# Patient Record
Sex: MALE | Race: WHITE | NOT HISPANIC OR LATINO | URBAN - METROPOLITAN AREA
[De-identification: names, ages, dates, MRNs, and addresses within clinical notes are randomized per-mention and may not be internally consistent; named-entity substitution may affect disease eponyms.]

---

## 2019-08-02 ENCOUNTER — APPOINTMENT (RX ONLY)
Dept: URBAN - METROPOLITAN AREA CLINIC 50 | Facility: CLINIC | Age: 54
Setting detail: DERMATOLOGY
End: 2019-08-02

## 2019-08-02 DIAGNOSIS — L82.0 INFLAMED SEBORRHEIC KERATOSIS: ICD-10-CM

## 2019-08-02 DIAGNOSIS — D22 MELANOCYTIC NEVI: ICD-10-CM

## 2019-08-02 DIAGNOSIS — D485 NEOPLASM OF UNCERTAIN BEHAVIOR OF SKIN: ICD-10-CM

## 2019-08-02 PROBLEM — D22.22 MELANOCYTIC NEVI OF LEFT EAR AND EXTERNAL AURICULAR CANAL: Status: ACTIVE | Noted: 2019-08-02

## 2019-08-02 PROBLEM — D48.5 NEOPLASM OF UNCERTAIN BEHAVIOR OF SKIN: Status: ACTIVE | Noted: 2019-08-02

## 2019-08-02 PROBLEM — I10 ESSENTIAL (PRIMARY) HYPERTENSION: Status: ACTIVE | Noted: 2019-08-02

## 2019-08-02 PROBLEM — E78.5 HYPERLIPIDEMIA, UNSPECIFIED: Status: ACTIVE | Noted: 2019-08-02

## 2019-08-02 PROBLEM — D22.39 MELANOCYTIC NEVI OF OTHER PARTS OF FACE: Status: ACTIVE | Noted: 2019-08-02

## 2019-08-02 PROCEDURE — 99202 OFFICE O/P NEW SF 15 MIN: CPT | Mod: 25

## 2019-08-02 PROCEDURE — 17110 DESTRUCTION B9 LES UP TO 14: CPT

## 2019-08-02 PROCEDURE — ? BIOPSY BY SHAVE METHOD

## 2019-08-02 PROCEDURE — ? COUNSELING

## 2019-08-02 PROCEDURE — ? LIQUID NITROGEN

## 2019-08-02 PROCEDURE — 11102 TANGNTL BX SKIN SINGLE LES: CPT | Mod: 59

## 2019-08-02 ASSESSMENT — LOCATION ZONE DERM
LOCATION ZONE: FACE
LOCATION ZONE: EAR
LOCATION ZONE: NECK

## 2019-08-02 ASSESSMENT — LOCATION DETAILED DESCRIPTION DERM
LOCATION DETAILED: LEFT EXTERNAL AUDITORY CANAL
LOCATION DETAILED: LEFT CENTRAL MALAR CHEEK
LOCATION DETAILED: RIGHT LATERAL SUBMANDIBULAR CHEEK
LOCATION DETAILED: RIGHT MID TEMPLE
LOCATION DETAILED: RIGHT CLAVICULAR NECK
LOCATION DETAILED: RIGHT MEDIAL ZYGOMA

## 2019-08-02 ASSESSMENT — LOCATION SIMPLE DESCRIPTION DERM
LOCATION SIMPLE: RIGHT ANTERIOR NECK
LOCATION SIMPLE: RIGHT ZYGOMA
LOCATION SIMPLE: RIGHT CHEEK
LOCATION SIMPLE: LEFT EAR
LOCATION SIMPLE: RIGHT TEMPLE
LOCATION SIMPLE: LEFT CHEEK

## 2019-08-02 NOTE — PROCEDURE: LIQUID NITROGEN
Consent: The patient's consent was obtained including but not limited to risks of crusting, scabbing, blistering, scarring, darker or lighter pigmentary change, recurrence, incomplete removal and infection.
Medical Necessity Information: It is in your best interest to select a reason for this procedure from the list below. All of these items fulfill various CMS LCD requirements except the new and changing color options.
Medical Necessity Clause: This procedure was medically necessary because the lesions that were treated were:
Add 52 Modifier (Optional): no
Detail Level: Simple
Post-Care Instructions: I reviewed with the patient in detail post-care instructions. Patient is to wear sunprotection, and avoid picking at any of the treated lesions. Pt may apply Vaseline to crusted or scabbing areas.

## 2019-10-04 ENCOUNTER — APPOINTMENT (RX ONLY)
Dept: URBAN - METROPOLITAN AREA CLINIC 50 | Facility: CLINIC | Age: 54
Setting detail: DERMATOLOGY
End: 2019-10-04

## 2019-10-04 DIAGNOSIS — D22 MELANOCYTIC NEVI: ICD-10-CM

## 2019-10-04 DIAGNOSIS — D485 NEOPLASM OF UNCERTAIN BEHAVIOR OF SKIN: ICD-10-CM

## 2019-10-04 PROBLEM — D22.39 MELANOCYTIC NEVI OF OTHER PARTS OF FACE: Status: ACTIVE | Noted: 2019-10-04

## 2019-10-04 PROBLEM — D48.5 NEOPLASM OF UNCERTAIN BEHAVIOR OF SKIN: Status: ACTIVE | Noted: 2019-10-04

## 2019-10-04 PROBLEM — D22.22 MELANOCYTIC NEVI OF LEFT EAR AND EXTERNAL AURICULAR CANAL: Status: ACTIVE | Noted: 2019-10-04

## 2019-10-04 PROCEDURE — ? BIOPSY BY SHAVE METHOD

## 2019-10-04 PROCEDURE — 99212 OFFICE O/P EST SF 10 MIN: CPT | Mod: 25

## 2019-10-04 PROCEDURE — 11103 TANGNTL BX SKIN EA SEP/ADDL: CPT

## 2019-10-04 PROCEDURE — ? COUNSELING

## 2019-10-04 PROCEDURE — 11102 TANGNTL BX SKIN SINGLE LES: CPT

## 2019-10-04 ASSESSMENT — LOCATION SIMPLE DESCRIPTION DERM
LOCATION SIMPLE: RIGHT EYELID
LOCATION SIMPLE: LEFT EAR
LOCATION SIMPLE: LEFT SUPERIOR EYELID
LOCATION SIMPLE: LEFT CHEEK

## 2019-10-04 ASSESSMENT — LOCATION ZONE DERM
LOCATION ZONE: EYELID
LOCATION ZONE: FACE
LOCATION ZONE: EAR

## 2019-10-04 ASSESSMENT — LOCATION DETAILED DESCRIPTION DERM
LOCATION DETAILED: LEFT TRAGUS
LOCATION DETAILED: RIGHT MEDIAL CANTHUS
LOCATION DETAILED: LEFT MEDIAL SUPERIOR EYELID
LOCATION DETAILED: LEFT SUPERIOR MEDIAL BUCCAL CHEEK

## 2019-10-04 NOTE — HPI: SKIN LESION (SKIN TAGS)
Is This A New Presentation, Or A Follow-Up?: Skin Lesions
Additional History: Pt states no other areas of concern at today’s exam.

## 2019-10-04 NOTE — PROCEDURE: BIOPSY BY SHAVE METHOD
Consent: Written consent was obtained and risks were reviewed including but not limited to scarring, infection, bleeding, scabbing, incomplete removal, nerve damage and allergy to anesthesia.
Depth Of Biopsy: dermis
Bill For Surgical Tray: no
X Size Of Lesion In Cm: 0
Biopsy Type: H and E
Electrodesiccation And Curettage Text: The wound bed was treated with electrodesiccation and curettage after the biopsy was performed.
Type Of Destruction Used: Curettage
Anesthesia Volume In Cc (Will Not Render If 0): 0.3
Render Post-Care Instructions In Note?: yes
Dressing: bandage
Silver Nitrate Text: The wound bed was treated with silver nitrate after the biopsy was performed.
Hemostasis: Electrocautery
Biopsy Method: Personna blade
Curettage Text: The wound bed was treated with curettage after the biopsy was performed.
Detail Level: Detailed
Lab: 6
Billing Type: Third-Party Bill
Post-Care Instructions: I reviewed with the patient in detail post-care instructions. Patient is to keep the biopsy site dry overnight, and then apply bacitracin twice daily until healed.
Cryotherapy Text: The wound bed was treated with cryotherapy after the biopsy was performed.
Size Of Lesion In Cm: 0.2
Notification Instructions: Patient will be notified of biopsy results. However, patient instructed to call the office if not contacted within 2 weeks.
Anesthesia Type: 1% lidocaine with epinephrine
Wound Care: Petrolatum
Electrodesiccation Text: The wound bed was treated with electrodesiccation after the biopsy was performed.

## 2021-05-18 ENCOUNTER — APPOINTMENT (RX ONLY)
Dept: URBAN - METROPOLITAN AREA CLINIC 66 | Facility: CLINIC | Age: 56
Setting detail: DERMATOLOGY
End: 2021-05-18

## 2021-05-18 DIAGNOSIS — L30.4 ERYTHEMA INTERTRIGO: ICD-10-CM

## 2021-05-18 DIAGNOSIS — D485 NEOPLASM OF UNCERTAIN BEHAVIOR OF SKIN: ICD-10-CM | Status: RESOLVED

## 2021-05-18 DIAGNOSIS — L82.0 INFLAMED SEBORRHEIC KERATOSIS: ICD-10-CM

## 2021-05-18 PROBLEM — D48.5 NEOPLASM OF UNCERTAIN BEHAVIOR OF SKIN: Status: ACTIVE | Noted: 2021-05-18

## 2021-05-18 PROCEDURE — 11310 SHAVE SKIN LESION 0.5 CM/<: CPT

## 2021-05-18 PROCEDURE — ? COUNSELING

## 2021-05-18 PROCEDURE — ? PRESCRIPTION MEDICATION MANAGEMENT

## 2021-05-18 PROCEDURE — ? PRESCRIPTION

## 2021-05-18 PROCEDURE — 99214 OFFICE O/P EST MOD 30 MIN: CPT | Mod: 25

## 2021-05-18 PROCEDURE — ? SHAVE REMOVAL (NO PATHOLOGY)

## 2021-05-18 PROCEDURE — ? BIOPSY BY SHAVE METHOD

## 2021-05-18 PROCEDURE — 11102 TANGNTL BX SKIN SINGLE LES: CPT | Mod: 59

## 2021-05-18 PROCEDURE — ? FULL BODY SKIN EXAM - DECLINED

## 2021-05-18 RX ORDER — NYSTATIN 100000 [USP'U]/G
1 CREAM TOPICAL BID
Qty: 1 | Refills: 3 | Status: ERX | COMMUNITY
Start: 2021-05-18

## 2021-05-18 RX ADMIN — NYSTATIN 1: 100000 CREAM TOPICAL at 00:00

## 2021-05-18 ASSESSMENT — LOCATION SIMPLE DESCRIPTION DERM
LOCATION SIMPLE: ABDOMEN
LOCATION SIMPLE: RIGHT FOREHEAD

## 2021-05-18 ASSESSMENT — LOCATION ZONE DERM
LOCATION ZONE: TRUNK
LOCATION ZONE: FACE

## 2021-05-18 ASSESSMENT — LOCATION DETAILED DESCRIPTION DERM
LOCATION DETAILED: XIPHOID
LOCATION DETAILED: PERIUMBILICAL SKIN
LOCATION DETAILED: RIGHT INFERIOR LATERAL FOREHEAD
LOCATION DETAILED: RIGHT LATERAL FOREHEAD

## 2022-01-28 ENCOUNTER — APPOINTMENT (RX ONLY)
Dept: URBAN - METROPOLITAN AREA CLINIC 50 | Facility: CLINIC | Age: 57
Setting detail: DERMATOLOGY
End: 2022-01-28

## 2022-01-28 DIAGNOSIS — L65.9 NONSCARRING HAIR LOSS, UNSPECIFIED: ICD-10-CM | Status: STABLE

## 2022-01-28 DIAGNOSIS — L91.8 OTHER HYPERTROPHIC DISORDERS OF THE SKIN: ICD-10-CM

## 2022-01-28 PROCEDURE — ? TREATMENT REGIMEN

## 2022-01-28 PROCEDURE — ? LIQUID NITROGEN

## 2022-01-28 PROCEDURE — 17110 DESTRUCTION B9 LES UP TO 14: CPT

## 2022-01-28 PROCEDURE — ? COUNSELING

## 2022-01-28 PROCEDURE — ? DEFER

## 2022-01-28 PROCEDURE — 99213 OFFICE O/P EST LOW 20 MIN: CPT | Mod: 25

## 2022-01-28 ASSESSMENT — LOCATION ZONE DERM
LOCATION ZONE: EYELID
LOCATION ZONE: FACE
LOCATION ZONE: SCALP

## 2022-01-28 ASSESSMENT — LOCATION DETAILED DESCRIPTION DERM
LOCATION DETAILED: RIGHT LATERAL INFERIOR EYELID
LOCATION DETAILED: LEFT CENTRAL POSTAURICULAR SKIN
LOCATION DETAILED: LEFT SUPERIOR CENTRAL MALAR CHEEK
LOCATION DETAILED: LEFT LATERAL SUPERIOR TARSAL REGION
LOCATION DETAILED: LEFT CENTRAL FRONTAL SCALP
LOCATION DETAILED: RIGHT MEDIAL FRONTAL SCALP
LOCATION DETAILED: LEFT LATERAL SUPERIOR EYELID
LOCATION DETAILED: RIGHT INFERIOR POSTAURICULAR SKIN
LOCATION DETAILED: RIGHT LATERAL SUPERIOR EYELID

## 2022-01-28 ASSESSMENT — LOCATION SIMPLE DESCRIPTION DERM
LOCATION SIMPLE: SCALP
LOCATION SIMPLE: LEFT LATERAL SUPERIOR TARSAL REGION
LOCATION SIMPLE: LEFT SUPERIOR EYELID
LOCATION SIMPLE: LEFT CHEEK
LOCATION SIMPLE: RIGHT SCALP
LOCATION SIMPLE: RIGHT SUPERIOR EYELID
LOCATION SIMPLE: RIGHT INFERIOR EYELID

## 2022-01-28 NOTE — PROCEDURE: LIQUID NITROGEN
Show Aperture Variable?: Yes
Include Z78.9 (Other Specified Conditions Influencing Health Status) As An Associated Diagnosis?: No
Consent: The patient's consent was obtained including but not limited to risks of crusting, scabbing, blistering, scarring, darker or lighter pigmentary change, recurrence, incomplete removal and infection.
Medical Necessity Clause: This procedure was medically necessary because the lesions that were treated were:
Post-Care Instructions: I reviewed with the patient in detail post-care instructions. Patient is to wear sunprotection, and avoid picking at any of the treated lesions. Pt may apply Vaseline to crusted or scabbing areas.
Detail Level: Detailed
Spray Paint Text: The liquid nitrogen was applied to the skin utilizing a spray paint frosting technique.
Medical Necessity Information: It is in your best interest to select a reason for this procedure from the list below. All of these items fulfill various CMS LCD requirements except the new and changing color options.

## 2022-01-28 NOTE — PROCEDURE: DEFER
Detail Level: Detailed
Instructions (Optional): Recommended pt f/u with ophthalmology for removal of tags on eyelids due to size, location, and proximity to the globe. Pt agreeable.
Introduction Text (Please End With A Colon): The following procedure was deferred:

## 2022-01-28 NOTE — PROCEDURE: TREATMENT REGIMEN
Plan: Briefly discussed Proscriptix line. Pt will continue using Keeps minoxidil foam.
Detail Level: Zone

## 2022-01-28 NOTE — HPI: SKIN LESION (SKIN TAGS)
How Severe Are They?: moderate
Is This A New Presentation, Or A Follow-Up?: Skin Lesions
Additional History: Pt stares lesions get irritated by grooming and wearing masks.

## 2023-04-04 ENCOUNTER — HOSPITAL ENCOUNTER (OUTPATIENT)
Dept: GENERAL RADIOLOGY | Facility: HOSPITAL | Age: 58
Discharge: HOME | End: 2023-04-04
Admitting: NURSE PRACTITIONER
Payer: COMMERCIAL

## 2023-04-04 ENCOUNTER — TRANSCRIBE ORDERS (OUTPATIENT)
Dept: ADMINISTRATIVE | Facility: HOSPITAL | Age: 58
End: 2023-04-04
Payer: COMMERCIAL

## 2023-04-04 DIAGNOSIS — I10 ESSENTIAL HYPERTENSION, MALIGNANT: Primary | ICD-10-CM

## 2023-04-04 DIAGNOSIS — I10 ESSENTIAL HYPERTENSION, MALIGNANT: ICD-10-CM

## 2023-04-04 PROCEDURE — 71046 X-RAY EXAM CHEST 2 VIEWS: CPT

## 2023-12-31 ENCOUNTER — HOSPITAL ENCOUNTER (EMERGENCY)
Facility: HOSPITAL | Age: 58
Discharge: HOME | End: 2023-12-31
Attending: STUDENT IN AN ORGANIZED HEALTH CARE EDUCATION/TRAINING PROGRAM | Admitting: STUDENT IN AN ORGANIZED HEALTH CARE EDUCATION/TRAINING PROGRAM
Payer: COMMERCIAL

## 2023-12-31 VITALS
RESPIRATION RATE: 20 BRPM | TEMPERATURE: 98.2 F | OXYGEN SATURATION: 95 % | WEIGHT: 268.5 LBS | BODY MASS INDEX: 37.59 KG/M2 | SYSTOLIC BLOOD PRESSURE: 126 MMHG | HEART RATE: 83 BPM | HEIGHT: 71 IN | DIASTOLIC BLOOD PRESSURE: 77 MMHG

## 2023-12-31 DIAGNOSIS — R04.0 FREQUENT EPISTAXIS: ICD-10-CM

## 2023-12-31 DIAGNOSIS — R04.0 ACUTE ANTERIOR EPISTAXIS: Primary | ICD-10-CM

## 2023-12-31 PROCEDURE — 63710000001 ONDANSETRON ODT 4 MG TABLET DISPERSIBLE: Performed by: STUDENT IN AN ORGANIZED HEALTH CARE EDUCATION/TRAINING PROGRAM

## 2023-12-31 PROCEDURE — 99283 EMERGENCY DEPT VISIT LOW MDM: CPT

## 2023-12-31 RX ORDER — CANDESARTAN 32 MG/1
32 TABLET ORAL DAILY
COMMUNITY

## 2023-12-31 RX ORDER — GUAIFENESIN, PSEUDOEPHEDRINE HYDROCHLORIDE 600; 60 MG/1; MG/1
1 TABLET, EXTENDED RELEASE ORAL EVERY 12 HOURS
COMMUNITY

## 2023-12-31 RX ORDER — ONDANSETRON 4 MG/1
4 TABLET, ORALLY DISINTEGRATING ORAL ONCE
Status: COMPLETED | OUTPATIENT
Start: 2023-12-31 | End: 2023-12-31

## 2023-12-31 RX ORDER — TRANEXAMIC ACID 100 MG/ML
500 INJECTION, SOLUTION INTRAVENOUS ONCE
Status: COMPLETED | OUTPATIENT
Start: 2023-12-31 | End: 2023-12-31

## 2023-12-31 RX ORDER — ROSUVASTATIN CALCIUM 10 MG/1
10 TABLET, COATED ORAL DAILY
COMMUNITY

## 2023-12-31 RX ORDER — ALFUZOSIN HYDROCHLORIDE 10 MG/1
10 TABLET, EXTENDED RELEASE ORAL DAILY
COMMUNITY

## 2023-12-31 RX ORDER — TRANEXAMIC ACID 100 MG/ML
500 INJECTION, SOLUTION INTRAVENOUS ONCE
Status: DISCONTINUED | OUTPATIENT
Start: 2023-12-31 | End: 2023-12-31

## 2023-12-31 RX ORDER — OXYMETAZOLINE HYDROCHLORIDE 0.05 G/100ML
2 SPRAY NASAL ONCE AS NEEDED
Status: COMPLETED | OUTPATIENT
Start: 2023-12-31 | End: 2023-12-31

## 2023-12-31 RX ADMIN — SILVER NITRATE APPLICATORS 1 APPLICATION: 25; 75 STICK TOPICAL at 02:06

## 2023-12-31 RX ADMIN — OXYMETAZOLINE HYDROCHLORIDE 2 SPRAY: 5 SPRAY NASAL at 02:00

## 2023-12-31 RX ADMIN — ONDANSETRON 4 MG: 4 TABLET, ORALLY DISINTEGRATING ORAL at 02:06

## 2023-12-31 RX ADMIN — TRANEXAMIC ACID 500 MG: 100 INJECTION, SOLUTION INTRAVENOUS at 02:06

## 2023-12-31 NOTE — Clinical Note
[facility] EMERGENCY DEPARTMENT  [address]  [facility] [address]  Phone: [phone]    Obed Sanches was seen and treated in our emergency department on 12/31/2023.  He may return to work on 01/01/2024.         Thank you for choosing [facility].    Marbin Meredith MD

## 2023-12-31 NOTE — ED PROVIDER NOTES
"EMERGENCY DEPARTMENT ATTENDING NOTE    Patient Name: Obed Sanches    Chief Complaint   Patient presents with    Nose Bleed       PATIENT PRESENTATION:  Obed Sanches is a very pleasant 58 y.o. male with history of frequent epistaxis that is never required emergency department intervention for his repair present emerged department due to intractable epistaxis.    Patient anticoagulation denies any dizziness or lightheadedness.  He states the bleeding is been intermittent and stopped and then recurred.  States he may have about 2 hours on the left side deafly on the right.  No trauma denies picking his nose or any recent trauma.  Denies anticoagulation including aspirin or Xarelto.  Denies any dizziness lightheadedness any chest pain any symptoms just getting the bleeding to stop.  No history of bleeding disorders.      PHYSICAL EXAM:   VS: /77   Pulse 83   Temp 98.2 °F (36.8 °C)   Resp 20   Ht 179.1 cm (70.5\")   Wt 122 kg (268 lb 8 oz)   SpO2 95%   BMI 37.98 kg/m²   GENERAL: Well-appearing male gentleman sitting up in stretcher no distress well-nourished, well-developed, awake, alert, no acute distress, nontoxic appearing, comfortable  EYES: PERRL, sclerae anicteric, extraocular movements grossly intact, symmetric lids  EARS, NOSE, MOUTH, THROAT: atraumatic external nose and ears, moist mucous membranes; left nare with evidence of significant blood clot with some venous oozing; no evidence of significant hemorrhage down the posterior pharynx  NECK: symmetric, trachea midline  RESPIRATORY: unlabored respiratory effort, clear to auscultation bilaterally, good air movement  CARDIOVASCULAR: no murmurs, peripheral pulses 2+ and equal in all extremities  SKIN: warm and dry with no obvious rashes  NEUROLOGIC: moving all 4 extremities symmetrically, alert and orient x 3  PSYCHIATRIC: alert, pleasant and cooperative. Appropriate mood and affect.      MEDICAL DECISION MAKING:    Obed Sanches is a 58 y.o. male " who presented to the ED due to significant epistaxis.  Patient large clot so immediately had patient blow his nose out to get all the clot removed he then had some persistent venous oozing.  Afrin splayed and then pressure was applied.  Following this he still with some large clot in the posterior nare which continued to ooze so had him blow his nose again and eventually got the entirety of the clot which essentially the entire nasal passage out.  Following that Afrin is applied a second time with significant pressure the bleeding then remitted.  Ultimately able to use silver nitrate and cauterized area of bleeding.  Patient was observed for over an hour with no recurrence of bleed.  Counseled him regarding return precautions and plan if he really bleeds.  He is discharged home with plan for with ENT as an outpatient with significant return precautions from the emerged part for assistance.  He will be given dose of oral Zofran given I suspect patient swallowed significant the blood although he was not endorsing significant nausea in the emergency department.    Internal chart review:   Past Medical History:   Diagnosis Date    BPH (benign prostatic hyperplasia)     Hyperlipidemia     Hypertension        Past Surgical History:   Procedure Laterality Date    TONSILLECTOMY         No Known Allergies    No current facility-administered medications for this encounter.    Current Outpatient Medications:     alfuzosin (UROXATRAL) 10 MG 24 hr tablet, Take 1 tablet by mouth Daily., Disp: , Rfl:     candesartan (ATACAND) 32 MG tablet, Take 1 tablet by mouth Daily., Disp: , Rfl:     pseudoephedrine-guaifenesin (MUCINEX D)  MG per 12 hr tablet, Take 1 tablet by mouth Every 12 (Twelve) Hours., Disp: , Rfl:     rosuvastatin (CRESTOR) 10 MG tablet, Take 1 tablet by mouth Daily., Disp: , Rfl:     Procedure:     Epistaxis Management    Date/Time: 12/31/2023 2:09 AM    Performed by: Marbin Meredith MD  Authorized by: Masoud  Marbin PÉREZ MD    Consent:     Consent obtained:  Verbal    Consent given by:  Patient    Risks, benefits, and alternatives were discussed: yes      Risks discussed:  Bleeding, infection, nasal injury and pain    Alternatives discussed:  Referral  [place] protocol:     Procedure explained and questions answered to patient or proxy's satisfaction: yes      Patient identity confirmed:  Verbally with patient  Procedure details:     Treatment site:  L septum    Treatment method:  Silver nitrate (& TXA soaked gauze & Afrin)    Treatment complexity:  Limited    Treatment episode: recurring    Post-procedure details:     Assessment:  Bleeding stopped    Procedure completion:  Tolerated well, no immediate complications      ED Diagnosis:  Frequent epistaxis; Acute anterior epistaxis    Disposition: to home  Follow up plan: ENT follow up within 1 week, return to ED immediately if symptoms worsen        Signed:  Marbin Meredith MD  Emergency Medicine Physician    Please note that portions of this note were completed with a voice recognition program.      Marbin Meredith MD  12/31/23 0818

## 2023-12-31 NOTE — ED PROVIDER NOTES
Subjective   History of Present Illness  Patient is a 58-year-old male that presents to the emergency department for nosebleed.  Patient reports that over the last 5 months he has been experiencing at least 1 nosebleed a month.  He states that usually these are following him blowing his nose and is usually always the left nare.  He states that today bleeding started through the left nare following patient blowing his nose and has been ongoing for the last 2 hours.  He states that he has tried applying pressure as well as Afrin nose spray prior to arrival that had no relief in symptoms.  Patient denies any anticoagulation therapy.  He denies any recent injury or trauma to the nose.  States that prior to 5 months ago he never had any issues with this.  Denies any      Review of Systems   HENT:  Positive for nosebleeds.    All other systems reviewed and are negative.      Past Medical History:   Diagnosis Date   • BPH (benign prostatic hyperplasia)    • Hyperlipidemia    • Hypertension        No Known Allergies    Past Surgical History:   Procedure Laterality Date   • TONSILLECTOMY         History reviewed. No pertinent family history.    Social History     Socioeconomic History   • Marital status:    Tobacco Use   • Smoking status: Never   • Smokeless tobacco: Never   Substance and Sexual Activity   • Alcohol use: Not Currently           Objective   Physical Exam    Procedures           ED Course                                             Medical Decision Making  Problems Addressed:  Frequent epistaxis: acute illness or injury        Final diagnoses:   Frequent epistaxis       ED Disposition  ED Disposition       ED Disposition   Discharge    Condition   Stable    Comment   --               Patrick Michele, APRN  [address] [facility] [address] [phone]    Schedule an appointment as soon as possible for a visit on 1/3/2024      Cheikh Mast MD  [address] [facility] [address] [facility]  [address]  [facility] [address]  [phone]    Schedule an appointment as soon as possible for a visit on 1/12/2024      [facility] EMERGENCY DEPARTMENT  [address] [place] [id number] [phone]    If symptoms worsen         Medication List      No changes were made to your prescriptions during this visit.

## 2023-12-31 NOTE — DISCHARGE INSTRUCTIONS
Today you are seen for your symptoms he had a pretty significant nosebleed.  We were able to get it to stop by placing silver nitrate cautery after using Afrin.  I discussed I want you to blow your nose for least the next 40 hours if possible.  Please call the attached number for our ENT group to schedule follow-up for later this week.  He can also follow-up with primary care provider.  If any of her symptoms worsen recurs we discussed first try blowing her nose spray and Afrin and holding it for the minutes at this stops then you should be okay but if it recurs or does not stop, to immediate return to the nearest emergency department.

## 2024-01-29 ENCOUNTER — HOSPITAL ENCOUNTER (OUTPATIENT)
Dept: GENERAL RADIOLOGY | Facility: HOSPITAL | Age: 59
Discharge: HOME | End: 2024-01-29
Admitting: NURSE PRACTITIONER
Payer: COMMERCIAL

## 2024-01-29 ENCOUNTER — TRANSCRIBE ORDERS (OUTPATIENT)
Dept: GENERAL RADIOLOGY | Facility: HOSPITAL | Age: 59
End: 2024-01-29
Payer: COMMERCIAL

## 2024-01-29 DIAGNOSIS — R05.2 SUBACUTE COUGH: ICD-10-CM

## 2024-01-29 DIAGNOSIS — R05.2 SUBACUTE COUGH: Primary | ICD-10-CM

## 2024-01-29 PROCEDURE — 71046 X-RAY EXAM CHEST 2 VIEWS: CPT

## 2024-05-25 ENCOUNTER — HOSPITAL ENCOUNTER (EMERGENCY)
Age: 59
Discharge: HOME | End: 2024-05-25
Attending: EMERGENCY MEDICINE
Payer: COMMERCIAL

## 2024-05-25 ENCOUNTER — APPOINTMENT (OUTPATIENT)
Dept: GENERAL RADIOLOGY | Age: 59
End: 2024-05-25
Payer: COMMERCIAL

## 2024-05-25 VITALS
SYSTOLIC BLOOD PRESSURE: 132 MMHG | TEMPERATURE: 98.2 F | HEART RATE: 78 BPM | OXYGEN SATURATION: 99 % | RESPIRATION RATE: 18 BRPM | DIASTOLIC BLOOD PRESSURE: 89 MMHG | WEIGHT: 255 LBS

## 2024-05-25 DIAGNOSIS — D69.6 THROMBOCYTOPENIA (HCC): ICD-10-CM

## 2024-05-25 DIAGNOSIS — M79.89 SWELLING OF RIGHT INDEX FINGER: Primary | ICD-10-CM

## 2024-05-25 LAB
ALBUMIN SERPL-MCNC: 4.4 G/DL (ref 3.5–5.2)
ALP SERPL-CCNC: 94 U/L (ref 40–130)
ALT SERPL-CCNC: 23 U/L (ref 5–41)
ANION GAP SERPL CALCULATED.3IONS-SCNC: 9 MMOL/L (ref 7–19)
AST SERPL-CCNC: 23 U/L (ref 5–40)
BASOPHILS # BLD: 0.1 K/UL (ref 0–0.2)
BASOPHILS NFR BLD: 0.7 % (ref 0–1)
BILIRUB SERPL-MCNC: 0.4 MG/DL (ref 0.2–1.2)
BUN SERPL-MCNC: 16 MG/DL (ref 6–20)
CALCIUM SERPL-MCNC: 9.3 MG/DL (ref 8.6–10)
CHLORIDE SERPL-SCNC: 102 MMOL/L (ref 98–111)
CO2 SERPL-SCNC: 27 MMOL/L (ref 22–29)
CREAT SERPL-MCNC: 0.9 MG/DL (ref 0.5–1.2)
EOSINOPHIL # BLD: 0.8 K/UL (ref 0–0.6)
EOSINOPHIL NFR BLD: 7.2 % (ref 0–5)
ERYTHROCYTE [DISTWIDTH] IN BLOOD BY AUTOMATED COUNT: 12.8 % (ref 11.5–14.5)
GLUCOSE SERPL-MCNC: 82 MG/DL (ref 74–109)
HCT VFR BLD AUTO: 44.9 % (ref 42–52)
HGB BLD-MCNC: 14.6 G/DL (ref 14–18)
IMM GRANULOCYTES # BLD: 0 K/UL
LYMPHOCYTES # BLD: 3.1 K/UL (ref 1.1–4.5)
LYMPHOCYTES NFR BLD: 27.5 % (ref 20–40)
MCH RBC QN AUTO: 29.6 PG (ref 27–31)
MCHC RBC AUTO-ENTMCNC: 32.5 G/DL (ref 33–37)
MCV RBC AUTO: 91.1 FL (ref 80–94)
MONOCYTES # BLD: 1.5 K/UL (ref 0–0.9)
MONOCYTES NFR BLD: 13.7 % (ref 0–10)
NEUTROPHILS # BLD: 5.7 K/UL (ref 1.5–7.5)
NEUTS SEG NFR BLD: 50.6 % (ref 50–65)
PLATELET # BLD AUTO: 30 K/UL (ref 130–400)
PLATELET SLIDE REVIEW: ABNORMAL
PMV BLD AUTO: 13.4 FL (ref 9.4–12.4)
POTASSIUM SERPL-SCNC: 4.3 MMOL/L (ref 3.5–5)
PROT SERPL-MCNC: 8 G/DL (ref 6.6–8.7)
RBC # BLD AUTO: 4.93 M/UL (ref 4.7–6.1)
SODIUM SERPL-SCNC: 138 MMOL/L (ref 136–145)
URATE SERPL-MCNC: 7.8 MG/DL (ref 3.4–7)
WBC # BLD AUTO: 11.2 K/UL (ref 4.8–10.8)

## 2024-05-25 PROCEDURE — 99284 EMERGENCY DEPT VISIT MOD MDM: CPT

## 2024-05-25 PROCEDURE — 73130 X-RAY EXAM OF HAND: CPT

## 2024-05-25 PROCEDURE — 36415 COLL VENOUS BLD VENIPUNCTURE: CPT

## 2024-05-25 PROCEDURE — 84550 ASSAY OF BLOOD/URIC ACID: CPT

## 2024-05-25 PROCEDURE — 6370000000 HC RX 637 (ALT 250 FOR IP): Performed by: EMERGENCY MEDICINE

## 2024-05-25 PROCEDURE — 80053 COMPREHEN METABOLIC PANEL: CPT

## 2024-05-25 PROCEDURE — 85025 COMPLETE CBC W/AUTO DIFF WBC: CPT

## 2024-05-25 RX ORDER — ACETAMINOPHEN 325 MG/1
650 TABLET ORAL ONCE
Status: COMPLETED | OUTPATIENT
Start: 2024-05-25 | End: 2024-05-25

## 2024-05-25 RX ORDER — CARVEDILOL 6.25 MG/1
1 TABLET ORAL 2 TIMES DAILY WITH MEALS
COMMUNITY

## 2024-05-25 RX ORDER — CEPHALEXIN 500 MG/1
500 CAPSULE ORAL 4 TIMES DAILY
Qty: 28 CAPSULE | Refills: 0 | Status: SHIPPED | OUTPATIENT
Start: 2024-05-25 | End: 2024-06-01

## 2024-05-25 RX ORDER — METHYLPREDNISOLONE 4 MG/1
TABLET ORAL
Qty: 1 KIT | Refills: 0 | Status: SHIPPED | OUTPATIENT
Start: 2024-05-25

## 2024-05-25 RX ORDER — ROSUVASTATIN CALCIUM 10 MG/1
10 TABLET, COATED ORAL DAILY
COMMUNITY

## 2024-05-25 RX ADMIN — ACETAMINOPHEN 650 MG: 325 TABLET ORAL at 03:21

## 2024-05-25 ASSESSMENT — PAIN SCALES - GENERAL
PAINLEVEL_OUTOF10: 8
PAINLEVEL_OUTOF10: 2
PAINLEVEL_OUTOF10: 8

## 2024-05-25 ASSESSMENT — PAIN DESCRIPTION - ORIENTATION: ORIENTATION: RIGHT

## 2024-05-25 ASSESSMENT — PAIN - FUNCTIONAL ASSESSMENT: PAIN_FUNCTIONAL_ASSESSMENT: 0-10

## 2024-05-25 ASSESSMENT — PAIN DESCRIPTION - LOCATION: LOCATION: FINGER (COMMENT WHICH ONE)

## 2024-05-25 ASSESSMENT — ENCOUNTER SYMPTOMS
COUGH: 0
RHINORRHEA: 0
SHORTNESS OF BREATH: 0
COLOR CHANGE: 1
VOMITING: 0
NAUSEA: 0
BACK PAIN: 0
ABDOMINAL PAIN: 0
SORE THROAT: 0
DIARRHEA: 0

## 2024-05-25 NOTE — ED PROVIDER NOTES
[facility] EMERGENCY DEPT  eMERGENCY dEPARTMENT eNCOUnter      Pt Name: Buddy Bangura  MRN: [id number]  Birthdate 1965  Date of evaluation: 5/25/2024  Provider: CITLALLI RENAE MD    CHIEF COMPLAINT       Chief Complaint   Patient presents with    hand swelling     Index finger swelling and redness x3 days with pain          HISTORY OF PRESENT ILLNESS   (Location/Symptom, Timing/Onset,Context/Setting, Quality, Duration, Modifying Factors, Severity)  Note limiting factors.   Buddy Bangura is a 58 y.o. male who presents to the emergency department for right index finger pain and swelling over the past 3 days.  Does not recall any injury.  No fevers or chills.  Noticed initially he had some redness and swelling over his PIP joint and then tonight noticed some increased redness and swelling over the metacarpal phalangeal area which prompted him to come in.  He states he is otherwise overall pretty healthy but recently had routine blood work and was found to be thrombocytopenic with a platelet count of 28,000 and has a hematology appointment this next week.  Admits to prior history of gout in his foot but many years ago.    The history is provided by the patient.       NursingNotes were reviewed.    REVIEW OF SYSTEMS    (2-9 systems for level 4, 10 or more for level 5)     Review of Systems   Constitutional:  Negative for chills and fever.   HENT:  Negative for rhinorrhea and sore throat.    Respiratory:  Negative for cough and shortness of breath.    Cardiovascular:  Negative for chest pain.   Gastrointestinal:  Negative for abdominal pain, diarrhea, nausea and vomiting.   Genitourinary:  Negative for dysuria and frequency.   Musculoskeletal:  Negative for back pain and neck pain.   Skin:  Positive for color change.   Neurological:  Negative for dizziness and headaches.            PAST MEDICALHISTORY     Past Medical History:   Diagnosis Date    Hyperlipidemia     Hypertension          SURGICAL HISTORY       Past Surgical History:  over the proximal interphalangeal and metacarpophalangeal joints with edema noted from the MCP to PIP area he can range his fingers slightly decreased though.  No obvious foreign bodies.  Isolated to only index finger area.   Skin:     General: Skin is warm and dry.   Neurological:      Mental Status: He is alert and oriented to person, place, and time.           DIAGNOSTIC RESULTS           RADIOLOGY:  Non-plain film images such as CT, Ultrasound and MRI are read by the radiologist. Plain radiographic images are visualized and preliminarily interpreted bythe emergency physician with the below findings:      XR HAND RIGHT (MIN 3 VIEWS)    (Results Pending)     Index finger swelling no FB or fx      LABS:  Labs Reviewed   CBC WITH AUTO DIFFERENTIAL - Abnormal; Notable for the following components:       Result Value    WBC 11.2 (*)     MCHC 32.5 (*)     Platelets 30 (*)     MPV 13.4 (*)     Monocytes % 13.7 (*)     Eosinophils % 7.2 (*)     Monocytes Absolute 1.50 (*)     Eosinophils Absolute 0.80 (*)     All other components within normal limits   URIC ACID - Abnormal; Notable for the following components:    Uric Acid 7.8 (*)     All other components within normal limits   COMPREHENSIVE METABOLIC PANEL       All other labs were within normal range or not returned as of this dictation.    EMERGENCY DEPARTMENT COURSE and DIFFERENTIAL DIAGNOSIS/MDM:   Vitals:    Vitals:    05/25/24 0127   BP: (!) 139/98   Pulse: 82   Resp: 18   Temp: 98.2 °F (36.8 °C)   TempSrc: Oral   SpO2: 97%   Weight: 115.7 kg (255 lb)       MDM     Amount and/or Complexity of Data Reviewed  Clinical lab tests: ordered and reviewed  Tests in the radiology section of CPT®: reviewed and ordered  Independent visualization of images, tracings, or specimens: yes      VSS, afebrile pt in NAD, 3 day hx of PIP joint erythema and edema now tonight with MCP with similar.  No trauma hx.  Xray neg besides can see some edema.  Wbc 11.2 uric 7.8.  has had gout

## 2024-05-31 ENCOUNTER — TELEPHONE (OUTPATIENT)
Dept: HEMATOLOGY | Age: 59
End: 2024-05-31

## 2024-05-31 NOTE — TELEPHONE ENCOUNTER
Called pt. to remind them of appointment on 06/2024 and had to leave a detailed voicemail with appointment date and time

## 2024-06-04 DIAGNOSIS — D69.6 THROMBOCYTOPENIA (HCC): Primary | ICD-10-CM

## 2024-06-04 NOTE — PROGRESS NOTES
OP HEMATOLOGY/ONCOLOGY CONSULTATION      Pt Name: Buddy Bangura  YOB: 1965  MRN: [id number]  Referring provider: FARSHAD Jiménez  Requesting provider: FARSHAD Calle  Reason for consultation: Thrombocytopenia   Date of evaluation: 6/5/2024    History Obtained From:  patient, electronic medical record    CHIEF COMPLAINT:    Chief Complaint   Patient presents with    New Patient     Thrombocytopenia     HISTORY OF PRESENT ILLNESS:    Buddy Bangura is a 58 y.o.  male referred to the clinic by FARSHAD Calle for evaluation of Thrombocytopenia.  Hematology consultation is performed 6/5/2024.    Review of prior CBCs:  CBC 3/13/2015 ([facility]): WBC: 8.50, Hgb: 14.1, MCV: 89.6, Platelets: 159,000  CBC 4/4/2023 (PCP): WBC: 8.9, Hgb: 15.7, MCV 91.1, Platelets: 51,000  CBC 5/15/2024 (PCP): WBC: 8.9, Hgb: 15.2, MCV: 90, Platelets: 22,000  CBC 5/25/2024 ([facility]): WBC: 11.2, Hgb: 14.6, MCV: 91.1, Platelets: 30,000, AMC: 1.50, Eos: 0.80    Additional labs:  CMP 5/25/2024 ([facility]): Creatinine 0.9, GFR >90, Calcium 9.3, Total Protein: 8.0, LFTs: unremarkable   Uric Acid 5/25/2024 ([facility]): 7.8 (3.4 - 7.0)    Patient denies any known history of thrombocytopenia.  He denies a history of alcohol use or known liver disease.  He has easy bruising, no bleeding issues.  He denies B symptoms.  Buddy reports intentional weight loss.  No palpable lymphadenopathies.  No overt splenomegaly.  Possible causes of thrombocytopenia discussed.  Baseline serology requested in addition to ultrasound liver and spleen.  If unrevealing, recommend Bone marrow aspirate and biopsy for further evaluation.  Platelets stable today at 31,000.    Past Medical History:   Diagnosis Date    Enlarged prostate     Hyperlipidemia     Hypertension     Low testosterone     Thrombocytopenia (HCC)      Past Surgical History:   Procedure Laterality Date    TONSILLECTOMY AND ADENOIDECTOMY         Current Outpatient Medications:     candesartan

## 2024-06-05 ENCOUNTER — OFFICE VISIT (OUTPATIENT)
Dept: HEMATOLOGY | Age: 59
End: 2024-06-05
Payer: COMMERCIAL

## 2024-06-05 ENCOUNTER — HOSPITAL ENCOUNTER (OUTPATIENT)
Dept: INFUSION THERAPY | Age: 59
Discharge: HOME | End: 2024-06-05
Payer: COMMERCIAL

## 2024-06-05 VITALS
OXYGEN SATURATION: 96 % | WEIGHT: 257 LBS | TEMPERATURE: 98.6 F | DIASTOLIC BLOOD PRESSURE: 86 MMHG | BODY MASS INDEX: 36.79 KG/M2 | HEART RATE: 92 BPM | SYSTOLIC BLOOD PRESSURE: 130 MMHG | HEIGHT: 70 IN

## 2024-06-05 DIAGNOSIS — Z71.89 CARE PLAN DISCUSSED WITH PATIENT: ICD-10-CM

## 2024-06-05 DIAGNOSIS — D69.6 THROMBOCYTOPENIA (HCC): Primary | ICD-10-CM

## 2024-06-05 DIAGNOSIS — D72.9 NEUTROPHILIC LEUKOCYTOSIS: ICD-10-CM

## 2024-06-05 DIAGNOSIS — R53.83 FATIGUE, UNSPECIFIED TYPE: ICD-10-CM

## 2024-06-05 DIAGNOSIS — D69.6 THROMBOCYTOPENIA (HCC): ICD-10-CM

## 2024-06-05 LAB
APTT PPP: 32.5 SEC (ref 26–36.2)
BASOPHILS # BLD: 0.08 K/UL (ref 0.01–0.08)
BASOPHILS NFR BLD: 0.8 % (ref 0.1–1.2)
EOSINOPHIL # BLD: 0.59 K/UL (ref 0.04–0.54)
EOSINOPHIL NFR BLD: 5.6 % (ref 0.7–7)
ERYTHROCYTE [DISTWIDTH] IN BLOOD BY AUTOMATED COUNT: 13.4 % (ref 11.6–14.4)
FOLATE SERPL-MCNC: 7.6 NG/ML (ref 4.5–32.2)
HAV IGM SERPL QL IA: NORMAL
HBV CORE IGM SERPL QL IA: NORMAL
HBV SURFACE AG SERPL QL IA: NORMAL
HCT VFR BLD AUTO: 44.3 % (ref 40.1–51)
HCV AB SERPL QL IA: NORMAL
HGB BLD-MCNC: 15.1 G/DL (ref 13.7–17.5)
HIV-1 P24 AG: NORMAL
HIV1+2 AB SERPLBLD QL IA.RAPID: NORMAL
INR PPP: 1.08 (ref 0.88–1.18)
LYMPHOCYTES # BLD: 2.5 K/UL (ref 1.18–3.74)
LYMPHOCYTES NFR BLD: 23.5 % (ref 19.3–53.1)
MCH RBC QN AUTO: 29.8 PG (ref 25.7–32.2)
MCHC RBC AUTO-ENTMCNC: 34.1 G/DL (ref 32.3–36.5)
MCV RBC AUTO: 87.4 FL (ref 79–92.2)
MONOCYTES # BLD: 1.3 K/UL (ref 0.24–0.82)
MONOCYTES NFR BLD: 12.2 % (ref 4.7–12.5)
NEUTROPHILS # BLD: 6.13 K/UL (ref 1.56–6.13)
NEUTS SEG NFR BLD: 57.6 % (ref 34–71.1)
PLATELET # BLD AUTO: 31 K/UL (ref 163–337)
PMV BLD AUTO: 12.9 FL (ref 7.4–10.4)
PROTHROMBIN TIME: 13.7 SEC (ref 12–14.6)
RBC # BLD AUTO: 5.07 M/UL (ref 4.63–6.08)
TSH SERPL DL<=0.005 MIU/L-ACNC: 3.15 UIU/ML (ref 0.27–4.2)
VIT B12 SERPL-MCNC: >2000 PG/ML (ref 211–946)
WBC # BLD AUTO: 10.63 K/UL (ref 4.23–9.07)

## 2024-06-05 PROCEDURE — 85025 COMPLETE CBC W/AUTO DIFF WBC: CPT

## 2024-06-05 PROCEDURE — 36415 COLL VENOUS BLD VENIPUNCTURE: CPT

## 2024-06-05 PROCEDURE — 99212 OFFICE O/P EST SF 10 MIN: CPT

## 2024-06-05 PROCEDURE — 99204 OFFICE O/P NEW MOD 45 MIN: CPT | Performed by: NURSE PRACTITIONER

## 2024-06-05 RX ORDER — CYANOCOBALAMIN (VITAMIN B-12) 1000 MCG
1000 TABLET, EXTENDED RELEASE ORAL DAILY
COMMUNITY

## 2024-06-05 RX ORDER — CANDESARTAN 32 MG/1
32 TABLET ORAL DAILY
COMMUNITY

## 2024-06-05 RX ORDER — COLCHICINE 0.6 MG/1
0.6 CAPSULE ORAL 2 TIMES DAILY
COMMUNITY

## 2024-06-05 RX ORDER — ACETAMINOPHEN 160 MG
2000 TABLET,DISINTEGRATING ORAL DAILY
COMMUNITY

## 2024-06-05 RX ORDER — ACETAMINOPHEN 500 MG
1000 TABLET ORAL EVERY 6 HOURS PRN
COMMUNITY

## 2024-06-05 RX ORDER — CALCIUM CARBONATE/VITAMIN D3 500-10/5ML
30 LIQUID (ML) ORAL DAILY
COMMUNITY

## 2024-06-05 RX ORDER — TAMSULOSIN HYDROCHLORIDE 0.4 MG/1
0.4 CAPSULE ORAL NIGHTLY
COMMUNITY
Start: 2024-05-15

## 2024-06-05 ASSESSMENT — ENCOUNTER SYMPTOMS
COUGH: 0
ABDOMINAL PAIN: 0
SHORTNESS OF BREATH: 0
EYE DISCHARGE: 0
DIARRHEA: 0
EYE ITCHING: 0
WHEEZING: 0
CONSTIPATION: 0
NAUSEA: 0
VOMITING: 0
TROUBLE SWALLOWING: 0
SORE THROAT: 0
ABDOMINAL DISTENTION: 1

## 2024-06-07 LAB
COPPER SERPL-MCNC: 98.4 UG/DL (ref 70–140)
ZINC SERPL-MCNC: 83.4 UG/DL (ref 60–120)

## 2024-06-09 LAB
ALBUMIN SERPL-MCNC: 4.38 G/DL (ref 3.75–5.01)
ALPHA1 GLOB SERPL ELPH-MCNC: 0.28 G/DL (ref 0.19–0.46)
ALPHA2 GLOB SERPL ELPH-MCNC: 0.72 G/DL (ref 0.48–1.05)
B-GLOBULIN SERPL ELPH-MCNC: 0.93 G/DL (ref 0.48–1.1)
DEPRECATED KAPPA LC FREE/LAMBDA SER: 0.98 {RATIO} (ref 0.26–1.65)
EER MONOCLONAL PROTEIN AND FLC, SERUM: ABNORMAL
GAMMA GLOB SERPL ELPH-MCNC: 1.19 G/DL (ref 0.62–1.51)
IGA SERPL-MCNC: 222 MG/DL (ref 68–408)
IGG SERPL-MCNC: 822 MG/DL (ref 768–1632)
IGM SERPL-MCNC: 297 MG/DL (ref 35–263)
INTERPRETATION SERPL IFE-IMP: ABNORMAL
INTERPRETATION SERPL IFE-IMP: ABNORMAL
KAPPA LC FREE SER-MCNC: 16.54 MG/L (ref 3.3–19.4)
LAMBDA LC FREE SERPL-MCNC: 16.93 MG/L (ref 5.71–26.3)
MONOCLONAL PROTEIN, SERUM: ABNORMAL G/DL
PROT SERPL-MCNC: 7.5 G/DL (ref 6.3–8.2)

## 2024-06-13 ENCOUNTER — HOSPITAL ENCOUNTER (OUTPATIENT)
Dept: INFUSION THERAPY | Age: 59
Discharge: HOME | End: 2024-06-13
Payer: COMMERCIAL

## 2024-06-13 ENCOUNTER — HOSPITAL ENCOUNTER (OUTPATIENT)
Dept: ULTRASOUND IMAGING | Age: 59
Discharge: HOME | End: 2024-06-13
Payer: COMMERCIAL

## 2024-06-13 DIAGNOSIS — D69.6 THROMBOCYTOPENIA (HCC): ICD-10-CM

## 2024-06-13 LAB
BASOPHILS # BLD: 0.05 K/UL (ref 0.01–0.08)
BASOPHILS NFR BLD: 0.6 % (ref 0.1–1.2)
EOSINOPHIL # BLD: 0.55 K/UL (ref 0.04–0.54)
EOSINOPHIL NFR BLD: 6.4 % (ref 0.7–7)
ERYTHROCYTE [DISTWIDTH] IN BLOOD BY AUTOMATED COUNT: 13.4 % (ref 11.6–14.4)
HCT VFR BLD AUTO: 46.2 % (ref 40.1–51)
HGB BLD-MCNC: 15.8 G/DL (ref 13.7–17.5)
LYMPHOCYTES # BLD: 2.76 K/UL (ref 1.18–3.74)
LYMPHOCYTES NFR BLD: 31.9 % (ref 19.3–53.1)
MCH RBC QN AUTO: 29.7 PG (ref 25.7–32.2)
MCHC RBC AUTO-ENTMCNC: 34.2 G/DL (ref 32.3–36.5)
MCV RBC AUTO: 86.8 FL (ref 79–92.2)
MONOCYTES # BLD: 1.14 K/UL (ref 0.24–0.82)
MONOCYTES NFR BLD: 13.2 % (ref 4.7–12.5)
NEUTROPHILS # BLD: 4.12 K/UL (ref 1.56–6.13)
NEUTS SEG NFR BLD: 47.6 % (ref 34–71.1)
PLATELET # BLD AUTO: 22 K/UL (ref 163–337)
PMV BLD AUTO: 12.4 FL (ref 7.4–10.4)
RBC # BLD AUTO: 5.32 M/UL (ref 4.63–6.08)
WBC # BLD AUTO: 8.65 K/UL (ref 4.23–9.07)

## 2024-06-13 PROCEDURE — 85025 COMPLETE CBC W/AUTO DIFF WBC: CPT

## 2024-06-13 PROCEDURE — 76705 ECHO EXAM OF ABDOMEN: CPT

## 2024-06-13 PROCEDURE — 36415 COLL VENOUS BLD VENIPUNCTURE: CPT

## 2024-06-20 ENCOUNTER — HOSPITAL ENCOUNTER (OUTPATIENT)
Dept: INFUSION THERAPY | Age: 59
Discharge: HOME | End: 2024-06-20
Payer: COMMERCIAL

## 2024-06-20 DIAGNOSIS — D69.6 THROMBOCYTOPENIA (HCC): ICD-10-CM

## 2024-06-20 LAB
BASOPHILS # BLD: 0.05 K/UL (ref 0.01–0.08)
BASOPHILS NFR BLD: 0.7 % (ref 0.1–1.2)
EOSINOPHIL # BLD: 0.64 K/UL (ref 0.04–0.54)
EOSINOPHIL NFR BLD: 8.5 % (ref 0.7–7)
ERYTHROCYTE [DISTWIDTH] IN BLOOD BY AUTOMATED COUNT: 13.4 % (ref 11.6–14.4)
HCT VFR BLD AUTO: 43.7 % (ref 40.1–51)
HGB BLD-MCNC: 14.9 G/DL (ref 13.7–17.5)
LYMPHOCYTES # BLD: 2.08 K/UL (ref 1.18–3.74)
LYMPHOCYTES NFR BLD: 27.8 % (ref 19.3–53.1)
MCH RBC QN AUTO: 30 PG (ref 25.7–32.2)
MCHC RBC AUTO-ENTMCNC: 34.1 G/DL (ref 32.3–36.5)
MCV RBC AUTO: 87.9 FL (ref 79–92.2)
MONOCYTES # BLD: 0.82 K/UL (ref 0.24–0.82)
MONOCYTES NFR BLD: 10.9 % (ref 4.7–12.5)
NEUTROPHILS # BLD: 3.88 K/UL (ref 1.56–6.13)
NEUTS SEG NFR BLD: 51.8 % (ref 34–71.1)
PLATELET # BLD AUTO: 60 K/UL (ref 163–337)
PMV BLD AUTO: 11.7 FL (ref 7.4–10.4)
RBC # BLD AUTO: 4.97 M/UL (ref 4.63–6.08)
WBC # BLD AUTO: 7.49 K/UL (ref 4.23–9.07)

## 2024-06-20 PROCEDURE — 85025 COMPLETE CBC W/AUTO DIFF WBC: CPT

## 2024-06-20 PROCEDURE — 36415 COLL VENOUS BLD VENIPUNCTURE: CPT

## 2024-06-26 ENCOUNTER — TELEPHONE (OUTPATIENT)
Dept: HEMATOLOGY | Age: 59
End: 2024-06-26

## 2024-06-26 NOTE — TELEPHONE ENCOUNTER
Called Patient and reminded patient of their appointment on 06/28/2024 and patient confirmed they would be here. Reminded patient to just come at appointment time, and to not come at the lab appointment time. Reminded patient that we will not check them in any more than 30 minutes before appointment time.

## 2024-06-27 NOTE — PROGRESS NOTES
OP HEMATOLOGY/ONCOLOGY PROGRESS NOTE      Pt Name: Buddy Bangura  YOB: 1965  MRN: [id number]  PCP: FARSHAD Calle  Date of evaluation: 2024    History Obtained From:  patient, electronic medical record    CHIEF COMPLAINT:    Chief Complaint   Patient presents with    Follow-up     Thrombocytopenia (HCC)       HISTORY OF PRESENT ILLNESS:  Buddy Bangura is a 58 y.o.  male returning to the clinic to discuss serologic and radiographic findings for evaluation of thrombocytopenia. Denies bleeding.  No B symptoms.  Nondrinker.  Platelets are 43,000 today.    HEMATOLOGY HISTORY: Thrombocytopenia  Buddy Hanna was seen in hematology consultation 2024, referred by FARSHAD Calle for evaluation of Thrombocytopenia.     Review of prior CBCs:  CBC 3/13/2015 ([facility]): WBC: 8.50, Hgb: 14.1, MCV: 89.6, Platelets: 159,000  CBC 2023 (PCP): WBC: 8.9, Hgb: 15.7, MCV 91.1, Platelets: 51,000  CBC 5/15/2024 (PCP): WBC: 8.9, Hgb: 15.2, MCV: 90, Platelets: 22,000  CBC 2024 ([facility]): WBC: 11.2, Hgb: 14.6, MCV: 91.1, Platelets: 30,000, AMC: 1.50, Eos: 0.80    Additional labs:  CMP 2024 ([facility]): Creatinine 0.9, GFR >90, Calcium 9.3, Total Protein: 8.0, LFTs: unremarkable   Uric Acid 2024 ([facility]): 7.8 (3.4 - 7.0)    Patient denies any known history of thrombocytopenia.  He denies a history of alcohol use or known liver disease.  He has easy bruising, no bleeding issues.  He denies B symptoms.  Buddy reports intentional weight loss.  No palpable lymphadenopathies.  No overt splenomegaly.  Possible causes of thrombocytopenia discussed.  Baseline serology requested in addition to ultrasound liver and spleen.  If unrevealing, recommend Bone marrow aspirate and biopsy for further evaluation.    Labs 2024  TSH: 3.15  HIV: Non-reactive  Hepatitis Panel: non-reactive  SPEP: normal SPEP pattern  Immunofixation: no monoclonal proteins seen  Ig, IgA: 222, IgM: 297  Kappa light chains: 16.54,

## 2024-06-28 ENCOUNTER — OFFICE VISIT (OUTPATIENT)
Dept: HEMATOLOGY | Age: 59
End: 2024-06-28
Payer: COMMERCIAL

## 2024-06-28 ENCOUNTER — HOSPITAL ENCOUNTER (OUTPATIENT)
Dept: INFUSION THERAPY | Age: 59
Discharge: HOME | End: 2024-06-28
Payer: COMMERCIAL

## 2024-06-28 VITALS
DIASTOLIC BLOOD PRESSURE: 80 MMHG | HEART RATE: 81 BPM | HEIGHT: 70 IN | BODY MASS INDEX: 36.22 KG/M2 | WEIGHT: 253 LBS | OXYGEN SATURATION: 96 % | SYSTOLIC BLOOD PRESSURE: 130 MMHG | TEMPERATURE: 98 F

## 2024-06-28 DIAGNOSIS — D69.6 THROMBOCYTOPENIA (HCC): ICD-10-CM

## 2024-06-28 DIAGNOSIS — D69.6 THROMBOCYTOPENIA (HCC): Primary | ICD-10-CM

## 2024-06-28 DIAGNOSIS — Z71.89 CARE PLAN DISCUSSED WITH PATIENT: ICD-10-CM

## 2024-06-28 LAB
BASOPHILS # BLD: 0.08 K/UL (ref 0.01–0.08)
BASOPHILS NFR BLD: 0.9 % (ref 0.1–1.2)
EOSINOPHIL # BLD: 0.55 K/UL (ref 0.04–0.54)
EOSINOPHIL NFR BLD: 6.1 % (ref 0.7–7)
ERYTHROCYTE [DISTWIDTH] IN BLOOD BY AUTOMATED COUNT: 13.2 % (ref 11.6–14.4)
HCT VFR BLD AUTO: 43.6 % (ref 40.1–51)
HGB BLD-MCNC: 14.9 G/DL (ref 13.7–17.5)
LYMPHOCYTES # BLD: 3.33 K/UL (ref 1.18–3.74)
LYMPHOCYTES NFR BLD: 37.2 % (ref 19.3–53.1)
MCH RBC QN AUTO: 29.7 PG (ref 25.7–32.2)
MCHC RBC AUTO-ENTMCNC: 34.2 G/DL (ref 32.3–36.5)
MCV RBC AUTO: 87 FL (ref 79–92.2)
MONOCYTES # BLD: 0.93 K/UL (ref 0.24–0.82)
MONOCYTES NFR BLD: 10.4 % (ref 4.7–12.5)
NEUTROPHILS # BLD: 4.04 K/UL (ref 1.56–6.13)
NEUTS SEG NFR BLD: 45.2 % (ref 34–71.1)
PLATELET # BLD AUTO: 43 K/UL (ref 163–337)
PMV BLD AUTO: 12.1 FL (ref 7.4–10.4)
RBC # BLD AUTO: 5.01 M/UL (ref 4.63–6.08)
WBC # BLD AUTO: 8.95 K/UL (ref 4.23–9.07)

## 2024-06-28 PROCEDURE — 85025 COMPLETE CBC W/AUTO DIFF WBC: CPT

## 2024-06-28 PROCEDURE — 36415 COLL VENOUS BLD VENIPUNCTURE: CPT

## 2024-06-28 PROCEDURE — 99213 OFFICE O/P EST LOW 20 MIN: CPT

## 2024-06-28 PROCEDURE — 99214 OFFICE O/P EST MOD 30 MIN: CPT | Performed by: NURSE PRACTITIONER

## 2024-06-29 ASSESSMENT — ENCOUNTER SYMPTOMS
TROUBLE SWALLOWING: 0
COUGH: 0
DIARRHEA: 0
ABDOMINAL PAIN: 0
VOMITING: 0
EYE DISCHARGE: 0
SORE THROAT: 0
EYE ITCHING: 0
CONSTIPATION: 0
SHORTNESS OF BREATH: 0
WHEEZING: 0
NAUSEA: 0
ABDOMINAL DISTENTION: 1

## 2024-07-02 ENCOUNTER — ANESTHESIA EVENT (OUTPATIENT)
Dept: OPERATING ROOM | Age: 59
End: 2024-07-02

## 2024-07-03 ENCOUNTER — ANESTHESIA (OUTPATIENT)
Dept: OPERATING ROOM | Age: 59
End: 2024-07-03

## 2024-07-03 ENCOUNTER — HOSPITAL ENCOUNTER (OUTPATIENT)
Age: 59
Setting detail: OUTPATIENT SURGERY
Discharge: HOME | End: 2024-07-03
Attending: INTERNAL MEDICINE | Admitting: INTERNAL MEDICINE
Payer: COMMERCIAL

## 2024-07-03 VITALS
DIASTOLIC BLOOD PRESSURE: 69 MMHG | RESPIRATION RATE: 18 BRPM | HEIGHT: 70 IN | OXYGEN SATURATION: 94 % | TEMPERATURE: 97.1 F | BODY MASS INDEX: 35.65 KG/M2 | HEART RATE: 66 BPM | SYSTOLIC BLOOD PRESSURE: 104 MMHG | WEIGHT: 249 LBS

## 2024-07-03 PROBLEM — D69.6 THROMBOCYTOPENIA (HCC): Status: ACTIVE | Noted: 2024-07-03

## 2024-07-03 PROCEDURE — 38222 DX BONE MARROW BX & ASPIR: CPT | Performed by: NURSE PRACTITIONER

## 2024-07-03 PROCEDURE — 38222 DX BONE MARROW BX & ASPIR: CPT

## 2024-07-03 RX ORDER — PROPOFOL 10 MG/ML
INJECTION, EMULSION INTRAVENOUS PRN
Status: DISCONTINUED | OUTPATIENT
Start: 2024-07-03 | End: 2024-07-03 | Stop reason: SDUPTHER

## 2024-07-03 RX ORDER — SODIUM CHLORIDE 9 MG/ML
INJECTION, SOLUTION INTRAVENOUS PRN
Status: DISCONTINUED | OUTPATIENT
Start: 2024-07-03 | End: 2024-07-03 | Stop reason: HOSPADM

## 2024-07-03 RX ORDER — LIDOCAINE HYDROCHLORIDE 10 MG/ML
1 INJECTION, SOLUTION EPIDURAL; INFILTRATION; INTRACAUDAL; PERINEURAL
Status: DISCONTINUED | OUTPATIENT
Start: 2024-07-03 | End: 2024-07-03 | Stop reason: HOSPADM

## 2024-07-03 RX ORDER — SODIUM CHLORIDE, SODIUM LACTATE, POTASSIUM CHLORIDE, CALCIUM CHLORIDE 600; 310; 30; 20 MG/100ML; MG/100ML; MG/100ML; MG/100ML
INJECTION, SOLUTION INTRAVENOUS CONTINUOUS
Status: DISCONTINUED | OUTPATIENT
Start: 2024-07-03 | End: 2024-07-03 | Stop reason: HOSPADM

## 2024-07-03 RX ORDER — LIDOCAINE HYDROCHLORIDE 10 MG/ML
INJECTION, SOLUTION EPIDURAL; INFILTRATION; INTRACAUDAL; PERINEURAL PRN
Status: DISCONTINUED | OUTPATIENT
Start: 2024-07-03 | End: 2024-07-03 | Stop reason: SDUPTHER

## 2024-07-03 RX ORDER — SODIUM CHLORIDE 0.9 % (FLUSH) 0.9 %
5-40 SYRINGE (ML) INJECTION PRN
Status: DISCONTINUED | OUTPATIENT
Start: 2024-07-03 | End: 2024-07-03 | Stop reason: HOSPADM

## 2024-07-03 RX ORDER — LIDOCAINE HYDROCHLORIDE 10 MG/ML
INJECTION, SOLUTION EPIDURAL; INFILTRATION; INTRACAUDAL; PERINEURAL PRN
Status: DISCONTINUED | OUTPATIENT
Start: 2024-07-03 | End: 2024-07-03 | Stop reason: ALTCHOICE

## 2024-07-03 RX ORDER — DEXAMETHASONE 4 MG/1
40 TABLET ORAL
Qty: 40 TABLET | Refills: 0 | Status: SHIPPED | OUTPATIENT
Start: 2024-07-03 | End: 2024-07-07

## 2024-07-03 RX ADMIN — LIDOCAINE HYDROCHLORIDE 50 MG: 10 INJECTION, SOLUTION EPIDURAL; INFILTRATION; INTRACAUDAL; PERINEURAL at 14:48

## 2024-07-03 RX ADMIN — PROPOFOL 200 MG: 10 INJECTION, EMULSION INTRAVENOUS at 14:48

## 2024-07-03 RX ADMIN — SODIUM CHLORIDE, SODIUM LACTATE, POTASSIUM CHLORIDE, CALCIUM CHLORIDE: 600; 310; 30; 20 INJECTION, SOLUTION INTRAVENOUS at 14:49

## 2024-07-03 ASSESSMENT — PAIN - FUNCTIONAL ASSESSMENT
PAIN_FUNCTIONAL_ASSESSMENT: NONE - DENIES PAIN
PAIN_FUNCTIONAL_ASSESSMENT: 0-10

## 2024-07-03 NOTE — OP NOTE
Pt Name: Buddy Bangura  YOB: 1965  MRN: [id number]    Date of procedure: 7/3/2024    Procedure Note:  Bone Marrow Aspirate and Biopsy    Indication:  thrombocytopenia    Site: Right iliac crest    Informed consent was signed by Buddy Bangura.   Time out was taken.  Buddy Bangura was placed in the left lateral decubitus position.    Buddy Bangura was prepped and draped in sterile fashion.    5 mL of 1% Lidocaine was used for local anesthetic of the skin and right periosteum.    A bone marrow aspirate and biopsy was obtained and sent for surgical pathology review, flow cytometry, and chromosome analysis.    The total blood loss was less than 3 mL.   The skin was cleaned and a sterile bandage was placed on the entrance site.  The patient tolerated the procedure without complication.     Keep scheduled appointment in clinic to review results.    FARSHAD Jiménez    07/03/24  3:07 PM

## 2024-07-03 NOTE — ANESTHESIA POSTPROCEDURE EVALUATION
Department of Anesthesiology  Postprocedure Note    Patient: Buddy Bangura  MRN: [id number]  YOB: 1965  Date of evaluation: 7/3/2024    Procedure Summary       Date: 07/03/24 Room / Location: [address]    Anesthesia Start: 1444 Anesthesia Stop: 1501    Procedure: BONE MARROW ASPIRATION BIOPSY (Pelvis) Diagnosis:       Thrombocytopenia (HCC)      (Thrombocytopenia (HCC) [D69.6])    Surgeons: Dafne Ramírez APRN Responsible Provider: Ayesha Willams APRN - CRNA    Anesthesia Type: general, TIVA ASA Status: 2            Anesthesia Type: No value filed.    Ana Phase I:      Ana Phase II:      Anesthesia Post Evaluation    Patient location during evaluation: bedside  Patient participation: complete - patient participated  Level of consciousness: sleepy but conscious  Pain score: 0  Airway patency: patent  Nausea & Vomiting: no nausea and no vomiting  Cardiovascular status: hemodynamically stable  Respiratory status: acceptable  Hydration status: stable  Pain management: adequate    No notable events documented.

## 2024-07-03 NOTE — ANESTHESIA PRE PROCEDURE
Department of Anesthesiology  Preprocedure Note       Name:  Buddy Bangura   Age:  58 y.o.  :  1965                                          MRN:  [id number]         Date:  7/3/2024      Surgeon: Surgeon(s):  Dafne Ramírez APRN    Procedure: Procedure(s):  BONE MARROW ASPIRATION BIOPSY    Medications prior to admission:   Prior to Admission medications    Medication Sig Start Date End Date Taking? Authorizing Provider   candesartan (ATACAND) 32 MG tablet Take 1 tablet by mouth daily    Nafisa Knott MD   tamsulosin (FLOMAX) 0.4 MG capsule Take 1 capsule by mouth nightly 5/15/24   Nafisa Knott MD   colchicine (MITIGARE) 0.6 MG capsule Take 1 capsule by mouth 2 times daily    Nafisa Knott MD   acetaminophen (TYLENOL) 500 MG tablet Take 2 tablets by mouth every 6 hours as needed for Pain  Patient not taking: Reported on 2024    Nafisa Knott MD   Cyanocobalamin (VITAMIN B-12) 1000 MCG extended release tablet Take 1 tablet by mouth daily    Nafisa Knott MD   FINASTERIDE-MINOXIDIL EX Apply 1 capsule topically daily    Nafisa Knott MD   Zinc 30 MG CAPS Take 30 mg by mouth daily    Nafisa Knott MD   Cholecalciferol (VITAMIN D3) 50 MCG (2000) CAPS Take 1 capsule by mouth daily    Nafisa Knott MD   NONFORMULARY Take 2 capsules by mouth daily Fat Burner    ProviderNafisa MD   NONFORMULARY Take 2 capsules by mouth Once a week at 5 PM Testboost (Testerone booster)    Nafisa Knott MD   rosuvastatin (CRESTOR) 10 MG tablet Take 1 tablet by mouth daily    Nafisa Knott MD       Current medications:    Current Facility-Administered Medications   Medication Dose Route Frequency Provider Last Rate Last Admin   • lidocaine PF 1 % injection 1 mL  1 mL IntraDERmal Once PRN Cecilia Noriega APRN - CRNA       • sodium chloride flush 0.9 % injection 5-40 mL  5-40 mL IntraVENous PRN Cecilia Noriega APRN - CRNA       • 0.9 % sodium

## 2024-07-03 NOTE — H&P
Patient Name: Buddy Bangura  : 1965  MRN: [id number]  DATE: 24    Allergies: No Known Allergies       History and Physical    Procedure:    [x] Bone Marrow Aspiration and Biopsy    [x] Previous office notes/History and Physical reviewed from the patients chart. Please see EMR for further details of HPI.   I have examined the patient's status immediately prior to the procedure and there are no changes since last evaluated on 2024    Indications/HPI:  Buddy Bangura has been under evaluation for thrombocytopenia.  Bone marrow aspirate and biopsy recommended for further evaluation. Risks, benefits and expectations discussed.    Anesthesia:   [x] MAC [] Moderate Sedation   [] General   [] None     ROS: 12 pt Review of Symptoms was negative unless mentioned above    Medications:   Prior to Admission medications    Medication Sig Start Date End Date Taking? Authorizing Provider   candesartan (ATACAND) 32 MG tablet Take 1 tablet by mouth daily    Nafisa Knott MD   tamsulosin (FLOMAX) 0.4 MG capsule Take 1 capsule by mouth nightly 5/15/24   Nafisa Knott MD   colchicine (MITIGARE) 0.6 MG capsule Take 1 capsule by mouth 2 times daily    Nafisa Knott MD   acetaminophen (TYLENOL) 500 MG tablet Take 2 tablets by mouth every 6 hours as needed for Pain  Patient not taking: Reported on 2024    Nafisa Knott MD   Cyanocobalamin (VITAMIN B-12) 1000 MCG extended release tablet Take 1 tablet by mouth daily    Nafisa Knott MD   FINASTERIDE-MINOXIDIL EX Apply 1 capsule topically daily    Nafisa Knott MD   Zinc 30 MG CAPS Take 30 mg by mouth daily    Nafisa Knott MD   Cholecalciferol (VITAMIN D3) 50 MCG (2000) CAPS Take 1 capsule by mouth daily    Nafisa Knott MD   NONFORMULARY Take 2 capsules by mouth daily Fat Burner    Nafisa Knott MD   NONFORMULARY Take 2 capsules by mouth Once a week at 5 PM Testboost (Testerone booster)

## 2024-07-03 NOTE — DISCHARGE SUMMARY
DISCHARGE SUMMARY      Buddy Bangura   1965    Admitting Provider: FARSHAD Jiménez/Brandan Leo MD    Date of Admission: 7/3/2024  Date of Discharge:  7/3/2024    Admitting Diagnosis: Active Problems:    Thrombocytopenia (HCC)    Discharge Diagnosis: Active Problems:    Thrombocytopenia (HCC)  Resolved Problems:    * No resolved hospital problems. *    History:   Buddy Bangura is a 58-year-old  gentleman who has been under evaluation for thrombocytopenia.  Bone marrow aspirate and biopsy recommended for further evaluation.    Hospital Course/Procedures Performed: Buddy Bangura underwent bone marrow aspirate and biopsy without complication.  Results pending.  Suspect ITP.  He will be prescribed Decadron 40 mg po every am x4, begin 7/4/2024. Take with food.    Condition on Discharge: Stable    Vital Signs  /69   Pulse 66   Temp 97.1 °F (36.2 °C) (Temporal)   Resp 18   Ht 1.778 m (5' 10\")   Wt 112.9 kg (249 lb)   SpO2 94%   BMI 35.73 kg/m²     Discharge Disposition: Home    Discharge Medications     Medication List        ASK your doctor about these medications      acetaminophen 500 MG tablet  Commonly known as: TYLENOL     candesartan 32 MG tablet  Commonly known as: ATACAND     FINASTERIDE-MINOXIDIL EX     Mitigare 0.6 MG capsule  Generic drug: colchicine     NONFORMULARY     NONFORMULARY     rosuvastatin 10 MG tablet  Commonly known as: CRESTOR     tamsulosin 0.4 MG capsule  Commonly known as: FLOMAX     vitamin B-12 1000 MCG extended release tablet     Vitamin D3 50 MCG (2000 UT) Caps     Zinc 30 MG Caps            Discharge Diet: Regular    Activity at Discharge: As tolerated with postanesthesia restrictions (i.e. do not drive for 24 hours, do not operate heavy machinery for 24 hours)    Follow-up Appointments: Keep scheduled follow-up in clinic in 2 weeks to discuss results    Test Results Pending at Discharge: Bone marrow aspirate and biopsy    Dafne Ramírez,

## 2024-07-10 ENCOUNTER — HOSPITAL ENCOUNTER (OUTPATIENT)
Dept: INFUSION THERAPY | Age: 59
Discharge: HOME | End: 2024-07-10
Payer: COMMERCIAL

## 2024-07-10 DIAGNOSIS — D69.6 THROMBOCYTOPENIA (HCC): ICD-10-CM

## 2024-07-10 LAB
BASOPHILS # BLD: 0.04 K/UL (ref 0.01–0.08)
BASOPHILS NFR BLD: 0.3 % (ref 0.1–1.2)
EOSINOPHIL # BLD: 0.05 K/UL (ref 0.04–0.54)
EOSINOPHIL NFR BLD: 0.4 % (ref 0.7–7)
ERYTHROCYTE [DISTWIDTH] IN BLOOD BY AUTOMATED COUNT: 13.6 % (ref 11.6–14.4)
HCT VFR BLD AUTO: 43.9 % (ref 40.1–51)
HGB BLD-MCNC: 15.4 G/DL (ref 13.7–17.5)
LYMPHOCYTES # BLD: 2.89 K/UL (ref 1.18–3.74)
LYMPHOCYTES NFR BLD: 21.7 % (ref 19.3–53.1)
MCH RBC QN AUTO: 30.1 PG (ref 25.7–32.2)
MCHC RBC AUTO-ENTMCNC: 35.1 G/DL (ref 32.3–36.5)
MCV RBC AUTO: 85.7 FL (ref 79–92.2)
MONOCYTES # BLD: 0.76 K/UL (ref 0.24–0.82)
MONOCYTES NFR BLD: 5.7 % (ref 4.7–12.5)
NEUTROPHILS # BLD: 9.28 K/UL (ref 1.56–6.13)
NEUTS SEG NFR BLD: 69.8 % (ref 34–71.1)
PLATELET # BLD AUTO: 107 K/UL (ref 163–337)
PMV BLD AUTO: 11.8 FL (ref 7.4–10.4)
RBC # BLD AUTO: 5.12 M/UL (ref 4.63–6.08)
WBC # BLD AUTO: 13.3 K/UL (ref 4.23–9.07)

## 2024-07-10 PROCEDURE — 36415 COLL VENOUS BLD VENIPUNCTURE: CPT

## 2024-07-10 PROCEDURE — 85025 COMPLETE CBC W/AUTO DIFF WBC: CPT

## 2024-08-06 ENCOUNTER — TELEPHONE (OUTPATIENT)
Dept: HEMATOLOGY | Age: 59
End: 2024-08-06

## 2024-08-06 NOTE — TELEPHONE ENCOUNTER
Called pt. to remind them of appointment on 08/08/2024 and had to leave a detailed voicemail with appointment date and time. Reminded patient to just come at appointment time, and to not come at the lab appointment time. Reminded patient that we will not check them in any more than 30 minutes before appointment time. We have now moved to the [facility] that is located between our old office and the ER at the [facility]

## 2024-08-08 ENCOUNTER — OFFICE VISIT (OUTPATIENT)
Dept: HEMATOLOGY | Age: 59
End: 2024-08-08
Payer: COMMERCIAL

## 2024-08-08 ENCOUNTER — HOSPITAL ENCOUNTER (OUTPATIENT)
Dept: INFUSION THERAPY | Age: 59
Discharge: HOME | End: 2024-08-08
Payer: COMMERCIAL

## 2024-08-08 VITALS
OXYGEN SATURATION: 98 % | DIASTOLIC BLOOD PRESSURE: 92 MMHG | BODY MASS INDEX: 36.68 KG/M2 | SYSTOLIC BLOOD PRESSURE: 142 MMHG | WEIGHT: 256.2 LBS | HEART RATE: 71 BPM | HEIGHT: 70 IN | TEMPERATURE: 98.1 F

## 2024-08-08 DIAGNOSIS — D69.3 IMMUNE THROMBOCYTOPENIA (HCC): Primary | ICD-10-CM

## 2024-08-08 DIAGNOSIS — Z71.89 CARE PLAN DISCUSSED WITH PATIENT: ICD-10-CM

## 2024-08-08 DIAGNOSIS — D72.9 NEUTROPHILIC LEUKOCYTOSIS: ICD-10-CM

## 2024-08-08 DIAGNOSIS — D69.6 THROMBOCYTOPENIA (HCC): ICD-10-CM

## 2024-08-08 LAB
BASOPHILS # BLD: 0.04 K/UL (ref 0.01–0.08)
BASOPHILS NFR BLD: 0.5 % (ref 0.1–1.2)
EOSINOPHIL # BLD: 0.59 K/UL (ref 0.04–0.54)
EOSINOPHIL NFR BLD: 6.9 % (ref 0.7–7)
ERYTHROCYTE [DISTWIDTH] IN BLOOD BY AUTOMATED COUNT: 14.4 % (ref 11.6–14.4)
HCT VFR BLD AUTO: 44.3 % (ref 40.1–51)
HGB BLD-MCNC: 15.1 G/DL (ref 13.7–17.5)
LYMPHOCYTES # BLD: 3.03 K/UL (ref 1.18–3.74)
LYMPHOCYTES NFR BLD: 35.3 % (ref 19.3–53.1)
MCH RBC QN AUTO: 30.9 PG (ref 25.7–32.2)
MCHC RBC AUTO-ENTMCNC: 34.1 G/DL (ref 32.3–36.5)
MCV RBC AUTO: 90.6 FL (ref 79–92.2)
MONOCYTES # BLD: 1.26 K/UL (ref 0.24–0.82)
MONOCYTES NFR BLD: 14.7 % (ref 4.7–12.5)
NEUTROPHILS # BLD: 3.63 K/UL (ref 1.56–6.13)
NEUTS SEG NFR BLD: 42.3 % (ref 34–71.1)
PLATELET # BLD AUTO: 132 K/UL (ref 163–337)
PMV BLD AUTO: 10.8 FL (ref 7.4–10.4)
RBC # BLD AUTO: 4.89 M/UL (ref 4.63–6.08)
WBC # BLD AUTO: 8.58 K/UL (ref 4.23–9.07)

## 2024-08-08 PROCEDURE — 99214 OFFICE O/P EST MOD 30 MIN: CPT | Performed by: NURSE PRACTITIONER

## 2024-08-08 PROCEDURE — 36415 COLL VENOUS BLD VENIPUNCTURE: CPT

## 2024-08-08 PROCEDURE — 99212 OFFICE O/P EST SF 10 MIN: CPT

## 2024-08-08 PROCEDURE — 85025 COMPLETE CBC W/AUTO DIFF WBC: CPT

## 2024-08-08 RX ORDER — DEXAMETHASONE 4 MG/1
40 TABLET ORAL
Qty: 40 TABLET | Refills: 0 | Status: SHIPPED | OUTPATIENT
Start: 2024-08-08 | End: 2024-08-12

## 2024-08-08 ASSESSMENT — ENCOUNTER SYMPTOMS
COUGH: 0
ABDOMINAL DISTENTION: 0
TROUBLE SWALLOWING: 0
SORE THROAT: 0
NAUSEA: 0
SHORTNESS OF BREATH: 0
EYE DISCHARGE: 0
CONSTIPATION: 0
WHEEZING: 0
ABDOMINAL PAIN: 0
DIARRHEA: 0
VOMITING: 0
EYE ITCHING: 0

## 2024-08-08 NOTE — PROGRESS NOTES
OP HEMATOLOGY/ONCOLOGY PROGRESS NOTE      Pt Name: Buddy Bangura  YOB: 1965  MRN: [id number]  PCP: FARSHAD Calle  Date of evaluation: 8/8/24    History Obtained From:  patient, electronic medical record    CHIEF COMPLAINT:    Chief Complaint   Patient presents with    Follow-up     Thrombocytopenia (HCC)     HISTORY OF PRESENT ILLNESS:  Buddy Bangura is a 58 y.o.  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    He was treated with Dexamethasone 40 mg  po daily x4 7/4/2024 - 7/7/2024.  Platelets improved from 43,000 on 7/4/2024 to 107,000 on 7/10/2024 and 132,000 today, 8/8/2024.  Buddy tolerated Dexamethasone without difficulty.    HEMATOLOGY HISTORY: ITP, 7/3/2024  Buddy Hanna was seen in hematology consultation 6/5/2024, referred by FARSHAD Calle for evaluation of Thrombocytopenia.     Review of prior CBCs:  CBC 3/13/2015 ([facility]): WBC: 8.50, Hgb: 14.1, MCV: 89.6, Platelets: 159,000  CBC 4/4/2023 (PCP): WBC: 8.9, Hgb: 15.7, MCV 91.1, Platelets: 51,000  CBC 5/15/2024 (PCP): WBC: 8.9, Hgb: 15.2, MCV: 90, Platelets: 22,000  CBC 5/25/2024 ([facility]): WBC: 11.2, Hgb: 14.6, MCV: 91.1, Platelets: 30,000, AMC: 1.50, Eos: 0.80    Additional labs:  CMP 5/25/2024 ([facility] ER): Creatinine 0.9, GFR >90, Calcium 9.3, Total Protein: 8.0, LFTs: unremarkable   Uric Acid 5/25/2024 ([facility] ER): 7.8 (3.4 - 7.0)    Patient denies any known history of thrombocytopenia.  He denies a history of alcohol use or known liver disease.  He has easy bruising, no bleeding issues.  He denies B symptoms.  Buddy reports intentional weight loss.  No palpable lymphadenopathies.  No overt splenomegaly.  Possible causes of thrombocytopenia discussed.  Baseline serology requested in addition to ultrasound liver and spleen.  If unrevealing, recommend Bone marrow aspirate and biopsy for further evaluation.    Labs 6/5/2024  TSH: 3.15  HIV:

## 2024-08-22 ENCOUNTER — HOSPITAL ENCOUNTER (OUTPATIENT)
Dept: INFUSION THERAPY | Age: 59
Discharge: HOME | End: 2024-08-22
Payer: COMMERCIAL

## 2024-08-22 DIAGNOSIS — D69.6 THROMBOCYTOPENIA (HCC): ICD-10-CM

## 2024-08-22 LAB
BASOPHILS # BLD: 0.03 K/UL (ref 0.01–0.08)
BASOPHILS NFR BLD: 0.3 % (ref 0.1–1.2)
EOSINOPHIL # BLD: 0.34 K/UL (ref 0.04–0.54)
EOSINOPHIL NFR BLD: 3 % (ref 0.7–7)
ERYTHROCYTE [DISTWIDTH] IN BLOOD BY AUTOMATED COUNT: 14.1 % (ref 11.6–14.4)
HCT VFR BLD AUTO: 43.4 % (ref 40.1–51)
HGB BLD-MCNC: 15 G/DL (ref 13.7–17.5)
LYMPHOCYTES # BLD: 2.76 K/UL (ref 1.18–3.74)
LYMPHOCYTES NFR BLD: 24.3 % (ref 19.3–53.1)
MCH RBC QN AUTO: 31.5 PG (ref 25.7–32.2)
MCHC RBC AUTO-ENTMCNC: 34.6 G/DL (ref 32.3–36.5)
MCV RBC AUTO: 91.2 FL (ref 79–92.2)
MONOCYTES # BLD: 1.45 K/UL (ref 0.24–0.82)
MONOCYTES NFR BLD: 12.8 % (ref 4.7–12.5)
NEUTROPHILS # BLD: 6.65 K/UL (ref 1.56–6.13)
NEUTS SEG NFR BLD: 58.5 % (ref 34–71.1)
PLATELET # BLD AUTO: 120 K/UL (ref 163–337)
PMV BLD AUTO: 10.9 FL (ref 7.4–10.4)
RBC # BLD AUTO: 4.76 M/UL (ref 4.63–6.08)
WBC # BLD AUTO: 11.36 K/UL (ref 4.23–9.07)

## 2024-08-22 PROCEDURE — 85025 COMPLETE CBC W/AUTO DIFF WBC: CPT

## 2024-08-22 PROCEDURE — 36415 COLL VENOUS BLD VENIPUNCTURE: CPT

## 2024-09-17 ENCOUNTER — TELEPHONE (OUTPATIENT)
Dept: HEMATOLOGY | Age: 59
End: 2024-09-17

## 2024-09-19 ENCOUNTER — OFFICE VISIT (OUTPATIENT)
Dept: HEMATOLOGY | Age: 59
End: 2024-09-19
Payer: COMMERCIAL

## 2024-09-19 ENCOUNTER — HOSPITAL ENCOUNTER (OUTPATIENT)
Dept: INFUSION THERAPY | Age: 59
Discharge: HOME | End: 2024-09-19
Payer: COMMERCIAL

## 2024-09-19 VITALS
HEIGHT: 70 IN | HEART RATE: 80 BPM | BODY MASS INDEX: 37.29 KG/M2 | DIASTOLIC BLOOD PRESSURE: 64 MMHG | SYSTOLIC BLOOD PRESSURE: 120 MMHG | TEMPERATURE: 98.8 F | OXYGEN SATURATION: 96 % | WEIGHT: 260.5 LBS

## 2024-09-19 DIAGNOSIS — Z79.899 MEDICATION MANAGEMENT: ICD-10-CM

## 2024-09-19 DIAGNOSIS — Z71.89 CARE PLAN DISCUSSED WITH PATIENT: ICD-10-CM

## 2024-09-19 DIAGNOSIS — D69.3 IMMUNE THROMBOCYTOPENIA (HCC): Primary | ICD-10-CM

## 2024-09-19 DIAGNOSIS — D69.3 IMMUNE THROMBOCYTOPENIA (HCC): ICD-10-CM

## 2024-09-19 LAB
BASOPHILS # BLD: 0.11 K/UL (ref 0.01–0.08)
BASOPHILS NFR BLD: 1 % (ref 0.1–1.2)
EOSINOPHIL # BLD: 0.19 K/UL (ref 0.04–0.54)
EOSINOPHIL NFR BLD: 1.7 % (ref 0.7–7)
ERYTHROCYTE [DISTWIDTH] IN BLOOD BY AUTOMATED COUNT: 13.5 % (ref 11.6–14.4)
HCT VFR BLD AUTO: 42.6 % (ref 40.1–51)
HGB BLD-MCNC: 14.4 G/DL (ref 13.7–17.5)
LYMPHOCYTES # BLD: 3.76 K/UL (ref 1.18–3.74)
LYMPHOCYTES NFR BLD: 34.3 % (ref 19.3–53.1)
MCH RBC QN AUTO: 31.7 PG (ref 25.7–32.2)
MCHC RBC AUTO-ENTMCNC: 33.8 G/DL (ref 32.3–36.5)
MCV RBC AUTO: 93.8 FL (ref 79–92.2)
MONOCYTES # BLD: 1.38 K/UL (ref 0.24–0.82)
MONOCYTES NFR BLD: 12.6 % (ref 4.7–12.5)
NEUTROPHILS # BLD: 5.45 K/UL (ref 1.56–6.13)
NEUTS SEG NFR BLD: 49.7 % (ref 34–71.1)
PLATELET # BLD AUTO: 177 K/UL (ref 163–337)
PMV BLD AUTO: 10.6 FL (ref 7.4–10.4)
RBC # BLD AUTO: 4.54 M/UL (ref 4.63–6.08)
WBC # BLD AUTO: 10.97 K/UL (ref 4.23–9.07)

## 2024-09-19 PROCEDURE — G2211 COMPLEX E/M VISIT ADD ON: HCPCS | Performed by: NURSE PRACTITIONER

## 2024-09-19 PROCEDURE — 36415 COLL VENOUS BLD VENIPUNCTURE: CPT

## 2024-09-19 PROCEDURE — 85025 COMPLETE CBC W/AUTO DIFF WBC: CPT

## 2024-09-19 PROCEDURE — 99213 OFFICE O/P EST LOW 20 MIN: CPT | Performed by: NURSE PRACTITIONER

## 2024-09-19 PROCEDURE — 99212 OFFICE O/P EST SF 10 MIN: CPT

## 2024-09-19 RX ORDER — TESTOSTERONE CYPIONATE 1000 MG/10ML
100 INJECTION, SOLUTION INTRAMUSCULAR
COMMUNITY

## 2024-09-19 RX ORDER — DEXAMETHASONE 4 MG/1
40 TABLET ORAL
Qty: 40 TABLET | Refills: 0 | Status: SHIPPED | OUTPATIENT
Start: 2024-09-19 | End: 2024-09-23

## 2024-09-19 ASSESSMENT — ENCOUNTER SYMPTOMS
EYE DISCHARGE: 0
SHORTNESS OF BREATH: 0
VOMITING: 0
WHEEZING: 0
CONSTIPATION: 0
ABDOMINAL PAIN: 0
EYE ITCHING: 0
ABDOMINAL DISTENTION: 0
NAUSEA: 0
COUGH: 0
DIARRHEA: 0
SORE THROAT: 0
TROUBLE SWALLOWING: 0

## 2024-09-26 ENCOUNTER — OFFICE VISIT (OUTPATIENT)
Dept: GASTROENTEROLOGY | Age: 59
End: 2024-09-26
Payer: COMMERCIAL

## 2024-09-26 VITALS
HEIGHT: 70 IN | OXYGEN SATURATION: 99 % | WEIGHT: 264 LBS | DIASTOLIC BLOOD PRESSURE: 83 MMHG | BODY MASS INDEX: 37.8 KG/M2 | HEART RATE: 87 BPM | SYSTOLIC BLOOD PRESSURE: 139 MMHG

## 2024-09-26 DIAGNOSIS — R14.2 BELCHING: ICD-10-CM

## 2024-09-26 DIAGNOSIS — Z12.11 COLON CANCER SCREENING: ICD-10-CM

## 2024-09-26 DIAGNOSIS — R13.10 DYSPHAGIA, UNSPECIFIED TYPE: Primary | ICD-10-CM

## 2024-09-26 DIAGNOSIS — R14.0 BLOATING: ICD-10-CM

## 2024-09-26 DIAGNOSIS — K21.9 GASTROESOPHAGEAL REFLUX DISEASE, UNSPECIFIED WHETHER ESOPHAGITIS PRESENT: ICD-10-CM

## 2024-09-26 PROCEDURE — 99204 OFFICE O/P NEW MOD 45 MIN: CPT | Performed by: NURSE PRACTITIONER

## 2024-09-26 RX ORDER — FEBUXOSTAT 40 MG/1
40 TABLET, FILM COATED ORAL DAILY
COMMUNITY
Start: 2024-09-23

## 2024-09-26 ASSESSMENT — ENCOUNTER SYMPTOMS
CONSTIPATION: 0
ABDOMINAL DISTENTION: 0
CHOKING: 0
SHORTNESS OF BREATH: 0
NAUSEA: 0
RECTAL PAIN: 0
ABDOMINAL PAIN: 0
VOMITING: 0
TROUBLE SWALLOWING: 1
COUGH: 0
BLOOD IN STOOL: 0
ANAL BLEEDING: 0
DIARRHEA: 0

## 2024-10-09 ENCOUNTER — ANESTHESIA EVENT (OUTPATIENT)
Dept: OPERATING ROOM | Age: 59
End: 2024-10-09

## 2024-10-11 ENCOUNTER — APPOINTMENT (OUTPATIENT)
Dept: OPERATING ROOM | Age: 59
End: 2024-10-11
Attending: INTERNAL MEDICINE

## 2024-10-11 ENCOUNTER — ANESTHESIA (OUTPATIENT)
Dept: OPERATING ROOM | Age: 59
End: 2024-10-11

## 2024-10-11 ENCOUNTER — HOSPITAL ENCOUNTER (OUTPATIENT)
Age: 59
Setting detail: SPECIMEN
Discharge: HOME | End: 2024-10-11

## 2024-10-11 ENCOUNTER — HOSPITAL ENCOUNTER (OUTPATIENT)
Age: 59
Setting detail: OUTPATIENT SURGERY
Discharge: HOME | End: 2024-10-11
Attending: INTERNAL MEDICINE | Admitting: INTERNAL MEDICINE
Payer: COMMERCIAL

## 2024-10-11 VITALS
OXYGEN SATURATION: 97 % | SYSTOLIC BLOOD PRESSURE: 122 MMHG | WEIGHT: 258 LBS | BODY MASS INDEX: 36.94 KG/M2 | TEMPERATURE: 97.8 F | HEART RATE: 76 BPM | DIASTOLIC BLOOD PRESSURE: 85 MMHG | RESPIRATION RATE: 16 BRPM | HEIGHT: 70 IN

## 2024-10-11 PROCEDURE — 45385 COLONOSCOPY W/LESION REMOVAL: CPT

## 2024-10-11 PROCEDURE — 45385 COLONOSCOPY W/LESION REMOVAL: CPT | Performed by: INTERNAL MEDICINE

## 2024-10-11 PROCEDURE — 43450 DILATE ESOPHAGUS 1/MULT PASS: CPT | Performed by: INTERNAL MEDICINE

## 2024-10-11 PROCEDURE — 43239 EGD BIOPSY SINGLE/MULTIPLE: CPT

## 2024-10-11 PROCEDURE — 43450 DILATE ESOPHAGUS 1/MULT PASS: CPT

## 2024-10-11 PROCEDURE — 43239 EGD BIOPSY SINGLE/MULTIPLE: CPT | Performed by: INTERNAL MEDICINE

## 2024-10-11 RX ORDER — SODIUM CHLORIDE, SODIUM LACTATE, POTASSIUM CHLORIDE, CALCIUM CHLORIDE 600; 310; 30; 20 MG/100ML; MG/100ML; MG/100ML; MG/100ML
INJECTION, SOLUTION INTRAVENOUS CONTINUOUS
Status: DISCONTINUED | OUTPATIENT
Start: 2024-10-11 | End: 2024-10-11 | Stop reason: HOSPADM

## 2024-10-11 RX ORDER — LIDOCAINE HYDROCHLORIDE 10 MG/ML
INJECTION, SOLUTION EPIDURAL; INFILTRATION; INTRACAUDAL; PERINEURAL
Status: DISCONTINUED | OUTPATIENT
Start: 2024-10-11 | End: 2024-10-11 | Stop reason: SDUPTHER

## 2024-10-11 RX ORDER — PROPOFOL 10 MG/ML
INJECTION, EMULSION INTRAVENOUS
Status: DISCONTINUED | OUTPATIENT
Start: 2024-10-11 | End: 2024-10-11 | Stop reason: SDUPTHER

## 2024-10-11 RX ORDER — DEXMEDETOMIDINE HYDROCHLORIDE 100 UG/ML
INJECTION, SOLUTION INTRAVENOUS
Status: DISCONTINUED | OUTPATIENT
Start: 2024-10-11 | End: 2024-10-11 | Stop reason: SDUPTHER

## 2024-10-11 RX ADMIN — PROPOFOL 50 MG: 10 INJECTION, EMULSION INTRAVENOUS at 11:00

## 2024-10-11 RX ADMIN — LIDOCAINE HYDROCHLORIDE 50 MG: 10 INJECTION, SOLUTION EPIDURAL; INFILTRATION; INTRACAUDAL; PERINEURAL at 11:00

## 2024-10-11 RX ADMIN — PROPOFOL 350 MG: 10 INJECTION, EMULSION INTRAVENOUS at 11:06

## 2024-10-11 RX ADMIN — SODIUM CHLORIDE, SODIUM LACTATE, POTASSIUM CHLORIDE, CALCIUM CHLORIDE: 600; 310; 30; 20 INJECTION, SOLUTION INTRAVENOUS at 10:08

## 2024-10-11 RX ADMIN — DEXMEDETOMIDINE HYDROCHLORIDE 10 MCG: 100 INJECTION, SOLUTION INTRAVENOUS at 10:56

## 2024-10-11 ASSESSMENT — PAIN - FUNCTIONAL ASSESSMENT: PAIN_FUNCTIONAL_ASSESSMENT: NONE - DENIES PAIN

## 2024-10-11 NOTE — DISCHARGE INSTRUCTIONS
POST-OP ORDERS: ENDOSCOPY & COLONOSCOPY:    1. Rest today.  2. DO NOT eat or drink until wide awake; eat your usual diet today in moderate amount only.  3. DO NOT drive today.  4. Call physician if you have severe pain, vomiting, fever, rectal bleeding or black bowel movements.  5.  If a biopsy was taken or a polyp removed, you should expect to hear results in about 21 days.  If you have heard nothing from your physician by then, call the office for results.  6.  Discharge home when patient awake, vitals signs stable and tolerating liquids.  7. Call with questions or concerns [phone].  NSAIDS (Non-steroidal Anti-Inflammatory)    You have been directed by your physician to avoid any NSAID's; the following medications are a list of those to avoid. If you think that you are taking any NSAID's notify your physician.     Over The Counter    Advil                      Motrin  Nuprin                   Ibuprofen  Midol                     Aleve  Naproxen              Orudis  Aspirin                   [place]    Prescriptions and Generics    Cataflam              Relafen  Voltaren               Clinoril  Indocin                 Naproxen  Arthrotec              Lodine  Daypro                 Nalfon  Toradol                Ansaid  Feldene               Meclofenamate  Fenoprofen          Ponstel  Mobic                   Celebrex  Vioxx    1.  Await path results, the patient will be contacted in 7-10 days with biopsy results.   2.  Magic mouthwash 5 ml PO Swish and swallow q3h PRN ONLY IF patient has post-procedural sorethroat or chest pain.  3. Full liquids to soft diet today adrienne discharge [facility] the surg[facility]; may advance diet starting in AM tomorrow.  4. May resume other meds except any ASA/NSAIDs; may use cough drops or lozenges PRN; also continue meds for GERD with anti-GERD measures.  5. NO ASA/NSAIDs x 2 weeks  6. OP f/u in 6-8 weeks with Ms. Carlisle; will consider an Esophageal manometry later if the

## 2024-10-11 NOTE — OP NOTE
Endoscopic Procedure Note    Patient: Buddy Bangura : 1965  [facility] Rec#: [id number] Acc#: [id number]     Primary Care Provider Raymond Hastings, FARSHAD - CNP    Endoscopist: Sanaz Reed MD, MD    Date of Procedure:  10/11/2024    Procedure:   EGD with    A Szymanski bougie dilation of esophagus and  Cold biopsies    Indications:   For both EGD and colonoscopy exams today:    1. Dysphagia, unspecified type  2. Gastroesophageal reflux disease, unspecified whether esophagitis present  3. Bloating  4. Belching    Anesthesia:  Sedation was administered by anesthesia who monitored the patient during the procedure.    Estimated Blood Loss: minimal    Procedure:   After reviewing the patient's chart and obtaining informed consent, the patient was placed in the left lateral decubitus position.  A forward-viewing Olympus endoscope was lubricated and inserted through the mouth into the oropharynx. Under direct visualization, the upper esophagus was intubated. The scope was advanced to the level of the third portion of duodenum. Scope was slowly withdrawn with careful inspection of the mucosal surfaces. The scope was retroflexed for inspection of the gastric fundus and incisura. Findings and maneuvers are listed in impression below.     Next, a lubricated Szymanski weighted Bougie dilator-54 Fr was gently introduced into the patient's mouth and passed into the Esophagus and into the proximal stomach without much resistance and then withdrawn. Repeat EGD was performed to verify dilation and scope tip was passed into the stomach. NO evidence of perforation or excessive bleeding was noted subsequent to the dilation.        The patient tolerated the procedure well. The scope was removed. There were no immediate complications.    Findings/IMPRESSION:  Esophagus: abnormal: Normal upper two thirds; in the distal third of the esophagus a partially obstructing, circumferential esophageal ring was noted and was successfully 
requiring external abdominal pressure during parts of this procedure .  The cecum was identified by the ileocecal valve and the appendiceal orifice. The colonoscope was then slowly withdrawn with careful inspection of the mucosa in a linear and circumferential fashion. The scope was retroflexed in the rectum. Suction was utilized during the procedure to remove as much air as possible from the bowel. The colonoscope was removed from the patient, and the procedure was terminated.  Findings are listed below.        Findings:   Approximately 9 to 10 mm in diameter sessile benign-appearing cecal polyp was removed by hot snare polypectomy.  A 6 mm in diameter sessile proximal rectal polyp also was removed by hot snare polypectomy technique.    NO larger polyps or masses or strictures or colitis.  Suboptimal exam due to prep quality as described above.    Internal hemorrhoids-Grade 1  Where it was clearly visible, the mucosa appeared normal throughout the entire examined colon  Retroflexion in the rectum was otherwise normal and revealed no further abnormalities      Recommendations:  1. Repeat colonoscopy: pending pathology -based on colonoscopy findings today and prep quality, in 5 years; sooner if the polyp in the cecum was a sessile serrated adenoma or if the patient's personal or family history as pertaining to colorectal cancer risk changes requiring an earlier exam or if the patient were to develop lower GI symptoms such as bleeding, abdominal pain, change in bowel habits or stool caliber or if the patient has anemia or unexplained weight loss in the future.   2. Await biopsy results-you will receive a letter with your results within 7-10 days    - Resume previous meds     Findings and recommendations were discussed w/ the patient. A copy of the images was provided.    (Please note that portions of this note were completed with a voice recognition program. Efforts were made to edit the dictations but occasionally

## 2024-10-11 NOTE — ANESTHESIA POSTPROCEDURE EVALUATION
Department of Anesthesiology  Postprocedure Note    Patient: Buddy Bangura  MRN: [id number]  YOB: 1965  Date of evaluation: 10/11/2024    Procedure Summary       Date: 10/11/24 Room / Location: [place] [address] / [facility]    Anesthesia Start: 1051 Anesthesia Stop:     Procedures:       ESOPHAGOGASTRODUODENOSCOPY BIOPSY (Esophagus)      COLORECTAL CANCER SCREENING, NOT HIGH RISK (Abdomen)      ESOPHAGOGASTRODUODENOSCOPY DILATION SAVORY Diagnosis:       Dysphagia, unspecified type      Gastroesophageal reflux disease, unspecified whether esophagitis present      Bloating      Belching      Screen for colon cancer      (Dysphagia, unspecified type [R13.10])      (Gastroesophageal reflux disease, unspecified whether esophagitis present [K21.9])      (Bloating [R14.0])      (Belching [R14.2])      (Screen for colon cancer [Z12.11])    Surgeons: Sanaz Reed MD Responsible Provider: Cecilia Noriega APRN - CRNA    Anesthesia Type: general, TIVA ASA Status: 2            Anesthesia Type: No value filed.    Ana Phase I:      Ana Phase II:      Anesthesia Post Evaluation    Patient location during evaluation: bedside  Patient participation: complete - patient participated  Level of consciousness: sleepy but conscious  Pain score: 0  Airway patency: patent  Nausea & Vomiting: no nausea and no vomiting  Cardiovascular status: blood pressure returned to baseline  Respiratory status: acceptable, room air and spontaneous ventilation  Hydration status: euvolemic  Pain management: adequate    No notable events documented.

## 2024-10-11 NOTE — H&P
Patient Name: Buddy Bangura  : 1965  MRN: [id number]  DATE: 10/11/24    Allergies: No Known Allergies     ENDOSCOPY  History and Physical    Procedure:    [] Diagnostic Colonoscopy       [x] Screening Colonoscopy  [x] EGD      [] ERCP      [] EUS       [] Other    [x] Previous office notes/History and Physical reviewed from the patients chart. Please see EMR for further details of HPI. I have examined the patient's status immediately prior to the procedure and:      Indications/HPI:     For both EGD and colonoscopy exams today:    1. Dysphagia, unspecified type  2. Gastroesophageal reflux disease, unspecified whether esophagitis present  3. Bloating  4. Belching  5. Colon cancer screening    []Abdominal Pain   []Cancer- GI/Lung     []Fhx of colon CA/polyps  []History of Polyps  []Barretts            []Melena  []Abnormal Imaging              []Dysphagia              []Persistent Pneumonia   []Anemia                            []Food Impaction        []History of Polyps  [] GI Bleed             []Pulmonary nodule/Mass   []Change in bowel habits []Heartburn/Reflux  []Rectal Bleed (BRBPR)  []Chest Pain - Non Cardiac []Heme (+) Stool []Ulcers  []Constipation  []Hemoptysis  []Varices  []Diarrhea  []Hypoxemia    []Nausea/Vomiting   []Screening   []Crohns/Colitis  []Other:     Anesthesia:   [x] MAC [] Moderate Sedation   [] General   [] None     ROS: 12 pt Review of Symptoms was negative unless mentioned above    Medications:   Prior to Admission medications    Medication Sig Start Date End Date Taking? Authorizing Provider   febuxostat (ULORIC) 40 MG TABS tablet Take 1 tablet by mouth daily 24  Yes Provider, Historical, MD   candesartan (ATACAND) 32 MG tablet Take 1 tablet by mouth daily   Yes Provider, Historical, MD   tamsulosin (FLOMAX) 0.4 MG capsule Take 1 capsule by mouth in the morning and at bedtime 5/15/24  Yes Provider, Historical, MD   colchicine (MITIGARE) 0.6 MG capsule Take 1 capsule by mouth

## 2024-10-11 NOTE — ANESTHESIA PRE PROCEDURE
Department of Anesthesiology  Preprocedure Note       Name:  Buddy Bangura   Age:  59 y.o.  :  1965                                          MRN:  [id number]         Date:  10/11/2024      Surgeon: Surgeon(s):  Sanaz Reed MD    Procedure: Procedure(s):  ESOPHAGOGASTRODUODENOSCOPY BIOPSY  COLORECTAL CANCER SCREENING, NOT HIGH RISK    Medications prior to admission:   Prior to Admission medications    Medication Sig Start Date End Date Taking? Authorizing Provider   febuxostat (ULORIC) 40 MG TABS tablet Take 1 tablet by mouth daily 24   Nafisa Knott MD   testosterone cypionate (DEPOTESTOTERONE CYPIONATE) 100 MG/ML injection Inject 1 mL into the muscle every 7 days.    Nafisa Knott MD   candesartan (ATACAND) 32 MG tablet Take 1 tablet by mouth daily    Nafisa Knott MD   tamsulosin (FLOMAX) 0.4 MG capsule Take 1 capsule by mouth in the morning and at bedtime 5/15/24   Nafisa Knott MD   colchicine (MITIGARE) 0.6 MG capsule Take 1 capsule by mouth as needed    Nafisa Knott MD   Zinc 30 MG CAPS Take 60 mg by mouth daily    Nafisa Knott MD   vitamin D3 (CHOLECALCIFEROL) 25 MCG (1000 UT) TABS tablet Take 2 tablets by mouth daily    Nafisa Knott MD   NONFORMULARY Take 2 capsules by mouth daily Fat Burner    Nafisa Knott MD   rosuvastatin (CRESTOR) 10 MG tablet Take 1 tablet by mouth daily    Nafisa Knott MD       Current medications:    No current facility-administered medications for this encounter.       Allergies:  No Known Allergies    Problem List:    Patient Active Problem List   Diagnosis Code   • Thrombocytopenia (HCC) D69.6       Past Medical History:        Diagnosis Date   • Enlarged prostate    • Hyperlipidemia    • Hypertension    • Low testosterone    • Thrombocytopenia (HCC)        Past Surgical History:        Procedure Laterality Date   • BONE MARROW BIOPSY N/A 7/3/2024    BONE MARROW ASPIRATION BIOPSY

## 2024-12-05 ENCOUNTER — OFFICE VISIT (OUTPATIENT)
Dept: GASTROENTEROLOGY | Age: 59
End: 2024-12-05
Payer: COMMERCIAL

## 2024-12-05 VITALS
BODY MASS INDEX: 38.08 KG/M2 | SYSTOLIC BLOOD PRESSURE: 150 MMHG | OXYGEN SATURATION: 96 % | DIASTOLIC BLOOD PRESSURE: 80 MMHG | HEART RATE: 84 BPM | HEIGHT: 70 IN | WEIGHT: 266 LBS

## 2024-12-05 DIAGNOSIS — R13.10 DYSPHAGIA, UNSPECIFIED TYPE: Primary | ICD-10-CM

## 2024-12-05 DIAGNOSIS — R14.0 BLOATING: ICD-10-CM

## 2024-12-05 DIAGNOSIS — R14.2 BELCHING: ICD-10-CM

## 2024-12-05 PROCEDURE — 99213 OFFICE O/P EST LOW 20 MIN: CPT | Performed by: NURSE PRACTITIONER

## 2024-12-05 RX ORDER — L.ACID/L.CASEI/B.BIF/B.LON/FOS 2B CELL-50
1 CAPSULE ORAL DAILY
Qty: 30 CAPSULE | Refills: 11 | Status: SHIPPED | OUTPATIENT
Start: 2024-12-05

## 2024-12-05 NOTE — PROGRESS NOTES
Subjective:     Patient ID: Buddy Bangura is a 59 y.o. male  PCP: Raymond Hastings APRN - CNP  Referring Provider: No ref. provider found    HPI  Patient presents to the office today with the following complaints: Follow-up      Patient seen in the office today for follow up after Colonoscopy and EGD   EGD, Colonoscopy, and pathology reports reviewed and discussed with the patient and all questions answered   Reports are in Epic  Denies any postprocedure complications      Reports he is swallowing much better   Acid reflux is under control as well as bloating and belching  Denies any new GI needs at this time     Assessment:     1. Dysphagia, unspecified type  2. Bloating  3. Belching       Review of Systems   Constitutional:  Negative for activity change, appetite change, fatigue, fever and unexpected weight change.   HENT:  Negative for trouble swallowing.    Respiratory:  Negative for cough, choking and shortness of breath.    Cardiovascular:  Negative for chest pain.   Gastrointestinal:  Negative for abdominal distention, abdominal pain, anal bleeding, blood in stool, constipation, diarrhea, nausea, rectal pain and vomiting.   Allergic/Immunologic: Negative for food allergies.   All other systems reviewed and are negative.      Plan:   Follow up as needed   Orders  No orders of the defined types were placed in this encounter.    Medications  Orders Placed This Encounter   Medications    probiotic (ALIGN/RISAQUAD) CAPS capsule     Sig: Take 1 capsule by mouth daily This is a probiotic. Take 1 tablet daily to maintain bowel regularity.     Dispense:  30 capsule     Refill:  11         Patient History:     Past Medical History:   Diagnosis Date    Enlarged prostate     Hyperlipidemia     Hypertension     Low testosterone     Thrombocytopenia (HCC)        Past Surgical History:   Procedure Laterality Date    BONE MARROW BIOPSY N/A 07/03/2024    BONE MARROW ASPIRATION BIOPSY performed by Dafne Ramírez APRN at

## 2024-12-12 ASSESSMENT — ENCOUNTER SYMPTOMS
RECTAL PAIN: 0
ANAL BLEEDING: 0
ABDOMINAL DISTENTION: 0
ABDOMINAL PAIN: 0
COUGH: 0
CHOKING: 0
SHORTNESS OF BREATH: 0
NAUSEA: 0
CONSTIPATION: 0
DIARRHEA: 0
BLOOD IN STOOL: 0
VOMITING: 0
TROUBLE SWALLOWING: 0

## 2025-03-13 ENCOUNTER — HOSPITAL ENCOUNTER (OUTPATIENT)
Dept: INFUSION THERAPY | Age: 60
Discharge: HOME | End: 2025-03-13
Payer: COMMERCIAL

## 2025-03-13 DIAGNOSIS — D69.6 THROMBOCYTOPENIA: ICD-10-CM

## 2025-03-13 LAB
BASOPHILS # BLD: 0.07 K/UL (ref 0–0.2)
BASOPHILS NFR BLD: 0.8 % (ref 0–1)
EOSINOPHIL # BLD: 0.48 K/UL (ref 0–0.6)
EOSINOPHIL NFR BLD: 5.8 % (ref 0–5)
ERYTHROCYTE [DISTWIDTH] IN BLOOD BY AUTOMATED COUNT: 13.7 % (ref 11.5–14.5)
HCT VFR BLD AUTO: 46.3 % (ref 42–52)
HGB BLD-MCNC: 16 G/DL (ref 14–18)
LYMPHOCYTES # BLD: 2.48 K/UL (ref 1.1–4.5)
LYMPHOCYTES NFR BLD: 29.8 % (ref 20–40)
MCH RBC QN AUTO: 31.4 PG (ref 27–31)
MCHC RBC AUTO-ENTMCNC: 34.6 G/DL (ref 33–37)
MCV RBC AUTO: 91 FL (ref 80–94)
MONOCYTES # BLD: 0.77 K/UL (ref 0–0.9)
MONOCYTES NFR BLD: 9.3 % (ref 1–10)
NEUTROPHILS # BLD: 4.49 K/UL (ref 1.5–7.5)
NEUTS SEG NFR BLD: 54.1 % (ref 50–65)
PLATELET # BLD AUTO: 103 K/UL (ref 130–400)
PMV BLD AUTO: 11.6 FL (ref 9.4–12.4)
RBC # BLD AUTO: 5.09 M/UL (ref 4.7–6.1)
WBC # BLD AUTO: 8.31 K/UL (ref 4.8–10.8)

## 2025-03-13 PROCEDURE — 36415 COLL VENOUS BLD VENIPUNCTURE: CPT

## 2025-03-13 PROCEDURE — 85025 COMPLETE CBC W/AUTO DIFF WBC: CPT

## 2025-03-17 ENCOUNTER — TELEPHONE (OUTPATIENT)
Dept: HEMATOLOGY | Age: 60
End: 2025-03-17

## 2025-03-17 NOTE — TELEPHONE ENCOUNTER
Called pt. to remind them of appointment on 03/20/2025 and had to leave a detailed voicemail with appointment date and time. Reminded patient to just come at appointment time, and to not come at the lab appointment time. Reminded patient that we will not check them in any more than 30 minutes before appointment time. We have now moved to the [facility] that is located between our old office and the ER at the hospitals. Letting the Pt know that our front entrance faces the [employer] [place] and leaving our address. Reminded pt to eat well and be well hydrated for their labs.

## 2025-03-19 NOTE — PROGRESS NOTES
seen in hematology consultation 2024, referred by FARSHAD Calle for evaluation of Thrombocytopenia.     Review of prior CBCs:  CBC 3/13/2015 ([facility]): WBC: 8.50, Hgb: 14.1, MCV: 89.6, Platelets: 159,000  CBC 2023 ([facility]): WBC: 8.9, Hgb: 15.7, MCV 91.1, Platelets: 51,000  CBC 5/15/2024 (PCP): WBC: 8.9, Hgb: 15.2, MCV: 90, Platelets: 22,000  CBC 2024 ([facility]): WBC: 11.2, Hgb: 14.6, MCV: 91.1, Platelets: 30,000, AMC: 1.50, Eos: 0.80    Additional labs:  CMP 2024 ([facility]): Creatinine 0.9, GFR >90, Calcium 9.3, Total Protein: 8.0, LFTs: unremarkable   Uric Acid 2024 ([facility]): 7.8 (3.4 - 7.0)    Patient denies any known history of thrombocytopenia.  He denies a history of alcohol use or known liver disease.  He has easy bruising, no bleeding issues.  He denies B symptoms.  Buddy reports intentional weight loss.  No palpable lymphadenopathies.  No overt splenomegaly.  Possible causes of thrombocytopenia discussed.  Baseline serology requested in addition to ultrasound liver and spleen.  If unrevealing, recommend Bone marrow aspirate and biopsy for further evaluation.    Labs 2024  TSH: 3.15  HIV: Non-reactive  Hepatitis Panel: non-reactive  SPEP: normal SPEP pattern  Immunofixation: no monoclonal proteins seen  Ig, IgA: 222, IgM: 297  Kappa light chains: 16.54, Lambda light chain: 16.93, L/K ratio: 1.02  PTT: 32.5  PT: 13.7  INR: 1.08  Folate: 7.6  Vitamin B12: >2000  Zinc: 83.4  Copper: 98.4    Ultrasound Abdomen 2024 at [facility]: Splenomegaly 13.4 x 5.7 x 13.4 cm with volume of 539 mL    Findings discussed with patient at follow-up on 2024, Suspect ITP.  Will arrange Bone marrow aspirate and biopsy to rule out other causes and plan tx with pulse Decadron after marrow.    Bone Marrow Aspiration and Biopsy 7/3/2024:   Normocellular (50% cellular) marrow with multilineage hematopoiesis.   Negative for dysplasia, no increase in blasts.   Storage iron present, no ring sideroblasts.  FLOW:

## 2025-03-20 ENCOUNTER — HOSPITAL ENCOUNTER (OUTPATIENT)
Dept: INFUSION THERAPY | Age: 60
Discharge: HOME | End: 2025-03-20
Payer: COMMERCIAL

## 2025-03-20 ENCOUNTER — OFFICE VISIT (OUTPATIENT)
Dept: HEMATOLOGY | Age: 60
End: 2025-03-20
Payer: COMMERCIAL

## 2025-03-20 VITALS
OXYGEN SATURATION: 98 % | WEIGHT: 276 LBS | DIASTOLIC BLOOD PRESSURE: 84 MMHG | SYSTOLIC BLOOD PRESSURE: 138 MMHG | TEMPERATURE: 97.8 F | BODY MASS INDEX: 39.51 KG/M2 | HEART RATE: 89 BPM | HEIGHT: 70 IN

## 2025-03-20 DIAGNOSIS — Z87.39 HISTORY OF GOUT: ICD-10-CM

## 2025-03-20 DIAGNOSIS — R63.5 WEIGHT GAIN: ICD-10-CM

## 2025-03-20 DIAGNOSIS — Z76.89 NEED FOR REFERRAL TO NUTRITIONAL SERVICES: ICD-10-CM

## 2025-03-20 DIAGNOSIS — Z71.89 CARE PLAN DISCUSSED WITH PATIENT: ICD-10-CM

## 2025-03-20 DIAGNOSIS — D69.6 THROMBOCYTOPENIA: ICD-10-CM

## 2025-03-20 DIAGNOSIS — D69.3 IMMUNE THROMBOCYTOPENIA (HCC): Primary | ICD-10-CM

## 2025-03-20 LAB
BASOPHILS # BLD: 0.06 K/UL (ref 0–0.2)
BASOPHILS NFR BLD: 0.7 % (ref 0–1)
EOSINOPHIL # BLD: 0.44 K/UL (ref 0–0.6)
EOSINOPHIL NFR BLD: 5.3 % (ref 0–5)
ERYTHROCYTE [DISTWIDTH] IN BLOOD BY AUTOMATED COUNT: 14.1 % (ref 11.5–14.5)
HCT VFR BLD AUTO: 46 % (ref 42–52)
HGB BLD-MCNC: 15.9 G/DL (ref 14–18)
LYMPHOCYTES # BLD: 2.49 K/UL (ref 1.1–4.5)
LYMPHOCYTES NFR BLD: 30.2 % (ref 20–40)
MCH RBC QN AUTO: 32 PG (ref 27–31)
MCHC RBC AUTO-ENTMCNC: 34.6 G/DL (ref 33–37)
MCV RBC AUTO: 92.6 FL (ref 80–94)
MONOCYTES # BLD: 0.76 K/UL (ref 0–0.9)
MONOCYTES NFR BLD: 9.2 % (ref 1–10)
NEUTROPHILS # BLD: 4.47 K/UL (ref 1.5–7.5)
NEUTS SEG NFR BLD: 54.2 % (ref 50–65)
PLATELET # BLD AUTO: 108 K/UL (ref 130–400)
PMV BLD AUTO: 11.3 FL (ref 9.4–12.4)
RBC # BLD AUTO: 4.97 M/UL (ref 4.7–6.1)
WBC # BLD AUTO: 8.25 K/UL (ref 4.8–10.8)

## 2025-03-20 PROCEDURE — 99213 OFFICE O/P EST LOW 20 MIN: CPT | Performed by: NURSE PRACTITIONER

## 2025-03-20 PROCEDURE — 99212 OFFICE O/P EST SF 10 MIN: CPT

## 2025-03-20 PROCEDURE — 85025 COMPLETE CBC W/AUTO DIFF WBC: CPT

## 2025-03-20 PROCEDURE — 36415 COLL VENOUS BLD VENIPUNCTURE: CPT

## 2025-03-20 RX ORDER — ANASTROZOLE 1 MG/1
1 TABLET ORAL DAILY
COMMUNITY
Start: 2025-02-27

## 2025-03-22 ASSESSMENT — ENCOUNTER SYMPTOMS
EYE DISCHARGE: 0
SHORTNESS OF BREATH: 0
WHEEZING: 0
EYE ITCHING: 0
COUGH: 0
SORE THROAT: 0
DIARRHEA: 0
NAUSEA: 0
ABDOMINAL PAIN: 0
TROUBLE SWALLOWING: 0
VOMITING: 0
ABDOMINAL DISTENTION: 0
CONSTIPATION: 0

## 2025-03-28 ENCOUNTER — OFFICE VISIT (OUTPATIENT)
Dept: HEMATOLOGY | Age: 60
End: 2025-03-28

## 2025-03-28 VITALS — HEIGHT: 70 IN | BODY MASS INDEX: 39.6 KG/M2

## 2025-03-28 DIAGNOSIS — R63.5 WEIGHT GAIN: Primary | ICD-10-CM

## 2025-03-28 NOTE — PROGRESS NOTES
[facility]emat[facility]  [address] [place] [address] [place] [address]  Phone: [phone]  Fax: [phone]    Smiley Cheung, MS, RD, LD   Buddy Bangura 59 y.o. Male     Comprehensive Nutrition Assessment    Type and Reason for Visit:  Initial, Consult    Malnutrition Assessment:  Malnutrition Status:  No malnutrition (03/28/25 1003)    Context:  Chronic Illness       Nutrition Assessment:    BS 58 y/o male presents 3/28/25 for initial RD evaluation via telephone. Referral from FARSHAD Mckeon for pt seeking assistance with weight loss. Pt reports growing concern over consistent weight gain. His usual weight is around 250lbs. He has gained weight to between 265-275lbs over the past several months. He feels his efforts at weight loss have been unsuccessful. He has historically been very active with running races and weightlifting. He continues to lift weights every other day but no longer does much cardio exercise.     Diet History:  Pt reports he has spent a large amount of money on trying various diet products and supplements. He did successfully lose 50lbs a few years ago by drinking 5 protein shakes daily and avoiding solid foods.     He tries to eat healthy foods but feels he does not see any benefit after following a diet for 2-3 months at a time. He notes his weight remains stable or increases whether he eats salads or pizza and hot dogs. He lives alone and prepares most meals for himself.     Pt eats 3 meals daily on his days off work. When he is working midnights 3-4 days out of the week, he will only eat 2 meals.       Anthropometric Measures:  Height: 177.8 cm (5' 10\")  Ideal Body Weight (IBW): 166 lbs (75 kg)       Current Body Weight: 120.7 kg (266 lb), 160.2 % IBW. Weight Source: Stated  Current BMI (kg/m2): 38.2  Wt Readings from Last 5 Encounters:   03/20/25 125.2 kg (276 lb)   12/05/24 120.7 kg (266 lb)   10/11/24 117 kg (258 lb)   09/26/24 119.7 kg (264 lb)

## 2025-04-04 ENCOUNTER — OFFICE VISIT (OUTPATIENT)
Dept: HEMATOLOGY | Age: 60
End: 2025-04-04

## 2025-04-04 VITALS — HEIGHT: 70 IN | BODY MASS INDEX: 39.6 KG/M2

## 2025-04-04 DIAGNOSIS — R63.5 WEIGHT GAIN: Primary | ICD-10-CM

## 2025-04-04 NOTE — PROGRESS NOTES
[facility] On[facility] & Hematology  [address] [place] [address] [place] [address]  Phone: [phone]  Fax: [phone]    Smiley Cheung, MS, RD, LD   Buddy Willem 59 y.o.       Comprehensive Nutrition Assessment    Type and Reason for Visit:  Reassess    Malnutrition Assessment:  Malnutrition Status: No malnutrition    Nutrition Assessment:    BS 58 y/o male presents 4/4/25 for follow-up RD evaluation. Referral from FARSHAD Mckeon for pt seeking assistance with weight loss. Pt reports growing concern over consistent weight gain. His usual weight is around 250lbs. He has gained weight to between 265-275lbs over the past several months. He feels his efforts at weight loss have been unsuccessful. He has historically been very active with running races and weightlifting. He continues to lift weights every other day but no longer does much cardio exercise.     He has reviewed the materials discussed at last week's session and presents with questions today. He has written down most of the foods he usually eats in a journal and included portion sizes with nutrient content. He did download the [employer] mason and input goals but is more comfortable writing down his intake. He has not yet made any dietary changes but plans to begin 4/7/25.     Diet History:  Pt reports he has spent a large amount of money on trying various diet products and supplements. He did successfully lose 50lbs a few years ago by drinking 5 protein shakes daily and avoiding solid foods.     He tries to eat healthy foods but feels he does not see any benefit after following a diet for 2-3 months at a time. He notes he will often eat less than 800 calories in a day.     He notes his weight remains stable or increases whether he eats salads or pizza and hot dogs. He lives alone and prepares most meals for himself.     Pt eats 3 meals daily on his days off work. When he is working midnights 3-4 days out of the week, he will

## 2025-05-23 ENCOUNTER — HOSPITAL ENCOUNTER (OUTPATIENT)
Dept: INFUSION THERAPY | Age: 60
Discharge: HOME | End: 2025-05-23
Payer: COMMERCIAL

## 2025-05-23 DIAGNOSIS — D69.6 THROMBOCYTOPENIA: ICD-10-CM

## 2025-05-23 DIAGNOSIS — D69.3 IMMUNE THROMBOCYTOPENIA (HCC): ICD-10-CM

## 2025-05-23 DIAGNOSIS — D69.3 IMMUNE THROMBOCYTOPENIA (HCC): Primary | ICD-10-CM

## 2025-05-23 LAB
BASOPHILS # BLD: 0.07 K/UL (ref 0–0.2)
BASOPHILS NFR BLD: 0.8 % (ref 0–1)
EOSINOPHIL # BLD: 0.77 K/UL (ref 0–0.6)
EOSINOPHIL NFR BLD: 9.3 % (ref 0–5)
ERYTHROCYTE [DISTWIDTH] IN BLOOD BY AUTOMATED COUNT: 12.2 % (ref 11.5–14.5)
FERRITIN SERPL-MCNC: 113 NG/ML (ref 30–400)
HCT VFR BLD AUTO: 47.7 % (ref 42–52)
HGB BLD-MCNC: 16.8 G/DL (ref 14–18)
IRON SATN MFR SERPL: 31 % (ref 20–50)
IRON SERPL-MCNC: 112 UG/DL (ref 59–158)
LYMPHOCYTES # BLD: 2.66 K/UL (ref 1.1–4.5)
LYMPHOCYTES NFR BLD: 32.3 % (ref 20–40)
MCH RBC QN AUTO: 32.1 PG (ref 27–31)
MCHC RBC AUTO-ENTMCNC: 35.2 G/DL (ref 33–37)
MCV RBC AUTO: 91 FL (ref 80–94)
MONOCYTES # BLD: 0.93 K/UL (ref 0–0.9)
MONOCYTES NFR BLD: 11.3 % (ref 1–10)
NEUTROPHILS # BLD: 3.79 K/UL (ref 1.5–7.5)
NEUTS SEG NFR BLD: 46.1 % (ref 50–65)
PLATELET # BLD AUTO: 93 K/UL (ref 130–400)
PMV BLD AUTO: 11.8 FL (ref 9.4–12.4)
RBC # BLD AUTO: 5.24 M/UL (ref 4.7–6.1)
TIBC SERPL-MCNC: 362 UG/DL (ref 250–400)
WBC # BLD AUTO: 8.24 K/UL (ref 4.8–10.8)

## 2025-05-23 PROCEDURE — 82728 ASSAY OF FERRITIN: CPT

## 2025-05-23 PROCEDURE — 83540 ASSAY OF IRON: CPT

## 2025-05-23 PROCEDURE — 85025 COMPLETE CBC W/AUTO DIFF WBC: CPT

## 2025-05-23 PROCEDURE — 83550 IRON BINDING TEST: CPT

## 2025-05-23 PROCEDURE — 36415 COLL VENOUS BLD VENIPUNCTURE: CPT

## 2025-07-15 DIAGNOSIS — D69.3 IMMUNE THROMBOCYTOPENIA (HCC): Primary | ICD-10-CM

## 2025-07-17 ENCOUNTER — HOSPITAL ENCOUNTER (OUTPATIENT)
Dept: INFUSION THERAPY | Age: 60
Discharge: HOME | End: 2025-07-17
Payer: COMMERCIAL

## 2025-07-17 DIAGNOSIS — D69.3 IMMUNE THROMBOCYTOPENIA (HCC): ICD-10-CM

## 2025-07-17 LAB
BASOPHILS # BLD: 0.09 K/UL (ref 0–0.2)
BASOPHILS NFR BLD: 0.9 % (ref 0–1)
EOSINOPHIL # BLD: 0.75 K/UL (ref 0–0.6)
EOSINOPHIL NFR BLD: 7.7 % (ref 0–5)
ERYTHROCYTE [DISTWIDTH] IN BLOOD BY AUTOMATED COUNT: 12.2 % (ref 11.5–14.5)
HCT VFR BLD AUTO: 46.9 % (ref 42–52)
HGB BLD-MCNC: 16.4 G/DL (ref 14–18)
LYMPHOCYTES # BLD: 3.44 K/UL (ref 1.1–4.5)
LYMPHOCYTES NFR BLD: 35.4 % (ref 20–40)
MCH RBC QN AUTO: 31.7 PG (ref 27–31)
MCHC RBC AUTO-ENTMCNC: 35 G/DL (ref 33–37)
MCV RBC AUTO: 90.7 FL (ref 80–94)
MONOCYTES # BLD: 1.08 K/UL (ref 0–0.9)
MONOCYTES NFR BLD: 11.1 % (ref 1–10)
NEUTROPHILS # BLD: 4.32 K/UL (ref 1.5–7.5)
NEUTS SEG NFR BLD: 44.6 % (ref 50–65)
PLATELET # BLD AUTO: 92 K/UL (ref 130–400)
PMV BLD AUTO: 11 FL (ref 9.4–12.4)
RBC # BLD AUTO: 5.17 M/UL (ref 4.7–6.1)
WBC # BLD AUTO: 9.71 K/UL (ref 4.8–10.8)

## 2025-07-17 PROCEDURE — 36415 COLL VENOUS BLD VENIPUNCTURE: CPT

## 2025-07-17 PROCEDURE — 85025 COMPLETE CBC W/AUTO DIFF WBC: CPT

## 2025-08-01 ENCOUNTER — OFFICE VISIT (OUTPATIENT)
Dept: FAMILY MEDICINE CLINIC | Facility: CLINIC | Age: 60
End: 2025-08-01
Payer: COMMERCIAL

## 2025-08-01 VITALS
BODY MASS INDEX: 38.8 KG/M2 | RESPIRATION RATE: 18 BRPM | SYSTOLIC BLOOD PRESSURE: 166 MMHG | OXYGEN SATURATION: 97 % | HEART RATE: 76 BPM | WEIGHT: 271 LBS | HEIGHT: 70 IN | DIASTOLIC BLOOD PRESSURE: 102 MMHG | TEMPERATURE: 98.4 F

## 2025-08-01 DIAGNOSIS — Z12.5 PROSTATE CANCER SCREENING: ICD-10-CM

## 2025-08-01 DIAGNOSIS — E78.2 MIXED HYPERLIPIDEMIA: Primary | ICD-10-CM

## 2025-08-01 DIAGNOSIS — N40.1 BENIGN PROSTATIC HYPERPLASIA WITH URINARY HESITANCY: ICD-10-CM

## 2025-08-01 DIAGNOSIS — I10 PRIMARY HYPERTENSION: ICD-10-CM

## 2025-08-01 DIAGNOSIS — Z13.1 DIABETES MELLITUS SCREENING: ICD-10-CM

## 2025-08-01 DIAGNOSIS — R53.82 CHRONIC FATIGUE: ICD-10-CM

## 2025-08-01 DIAGNOSIS — E66.01 CLASS 2 SEVERE OBESITY DUE TO EXCESS CALORIES WITH SERIOUS COMORBIDITY AND BODY MASS INDEX (BMI) OF 38.0 TO 38.9 IN ADULT: ICD-10-CM

## 2025-08-01 DIAGNOSIS — K90.49 FOOD INTOLERANCE: ICD-10-CM

## 2025-08-01 DIAGNOSIS — G47.10 HYPERSOMNIA: ICD-10-CM

## 2025-08-01 DIAGNOSIS — E66.812 CLASS 2 SEVERE OBESITY DUE TO EXCESS CALORIES WITH SERIOUS COMORBIDITY AND BODY MASS INDEX (BMI) OF 38.0 TO 38.9 IN ADULT: ICD-10-CM

## 2025-08-01 DIAGNOSIS — R16.1 SPLENOMEGALY: ICD-10-CM

## 2025-08-01 DIAGNOSIS — R79.89 LOW TESTOSTERONE: ICD-10-CM

## 2025-08-01 DIAGNOSIS — M1A.09X0 CHRONIC GOUT OF MULTIPLE SITES, UNSPECIFIED CAUSE: ICD-10-CM

## 2025-08-01 DIAGNOSIS — D69.6 THROMBOCYTOPENIA: ICD-10-CM

## 2025-08-01 DIAGNOSIS — R39.11 BENIGN PROSTATIC HYPERPLASIA WITH URINARY HESITANCY: ICD-10-CM

## 2025-08-01 PROCEDURE — 99204 OFFICE O/P NEW MOD 45 MIN: CPT | Performed by: FAMILY MEDICINE

## 2025-08-01 RX ORDER — SEMAGLUTIDE 0.25 MG/.5ML
0.25 INJECTION, SOLUTION SUBCUTANEOUS WEEKLY
Qty: 2 ML | Refills: 0 | Status: SHIPPED | OUTPATIENT
Start: 2025-08-01

## 2025-08-01 RX ORDER — ANASTROZOLE 1 MG/1
TABLET ORAL DAILY
COMMUNITY

## 2025-08-01 RX ORDER — TESTOSTERONE ENANTHATE 100 MG/.5ML
INJECTION SUBCUTANEOUS
COMMUNITY

## 2025-08-01 RX ORDER — COLCHICINE 0.6 MG/1
0.6 CAPSULE ORAL DAILY
COMMUNITY

## 2025-08-01 RX ORDER — FEBUXOSTAT 40 MG/1
40 TABLET, FILM COATED ORAL DAILY
COMMUNITY

## 2025-08-01 RX ORDER — CANDESARTAN 32 MG/1
32 TABLET ORAL DAILY
Qty: 90 TABLET | Refills: 1 | Status: SHIPPED | OUTPATIENT
Start: 2025-08-01

## 2025-08-01 RX ORDER — ALLOPURINOL 300 MG/1
300 TABLET ORAL DAILY
COMMUNITY

## 2025-08-01 RX ORDER — TAMSULOSIN HYDROCHLORIDE 0.4 MG/1
1 CAPSULE ORAL DAILY
COMMUNITY

## 2025-08-01 NOTE — PROGRESS NOTES
Subjective   Obed Sanches is a 59 y.o. male.     History of Present Illness  The patient presents for evaluation of weight gain, elevated blood pressure, low testosterone, and bloating.    He reports a gradual decline in his health, feeling that his current physician and insurance company are not adequately addressing his concerns. Despite significant medical expenses, he does not believe his health goals are being met. He was diagnosed with high cholesterol and hypertension through routine blood work and was prescribed medications for both conditions. Additionally, he developed gout, which initially responded to medication but later recurred, affecting various parts of his body. He was prescribed colchicine and allopurinol for gout. He also experienced urinary issues and was given medication to manage his prostate health.    Previously, he was an active individual, participating in numerous races and weightlifting. However, he has noticed a steady weight gain over the past few years, despite maintaining a caloric deficit. He has been taking vitamin D3, zinc, and vitamin B12 supplements, which have helped alleviate some of his aches and pains. He also consumes a protein shake with probiotics and vitamins but is unsure of its benefits. His testosterone levels were found to be low, leading to the prescription of weekly 100 mg injections. However, he was informed that these injections are not sufficient to meet his testosterone needs. He is considering discontinuing the injections due to their cost and perceived lack of efficacy. He is also taking Arimidex as an estrogen blocker. He has gained approximately 15 pounds this year and is keen to regain control over his weight. He is unsure if his diet is contributing to his weight gain, as he monitors his carbohydrate and micronutrient intake.    He has not been diagnosed with diabetes. He underwent a sleep study several years ago, which did not indicate sleep apnea. He  "occasionally wakes up at night to urinate but generally sleeps well. He feels rested upon waking but experiences fatigue long-term through the day. He is unsure if he snores and does not wake up with headaches or dry mouth. He experiences bloating and hardness in his stomach, regardless of his diet. He underwent an endoscopy less than a year ago, which revealed a small polyp but no other abnormalities. He has low blood platelets, but extensive testing at a cancer center did not reveal any issues. He has had his liver checked, which was normal.    He does not smoke or use drugs and drinks alcohol in moderation. He is interested in resuming running but is concerned about potential risks due to his weight. He experiences neck pain when running, which he attributes to looking down while walking.    His blood pressure was well-controlled on candesartan 32 mg once daily, with readings around 120/80 or 120/79. However, he has not taken the medication for the past two days due to a delay in refilling the prescription.    Hobbies: Running, weightlifting  Diet: Monitors carbohydrate and micronutrient intake  Alcohol: Drinks alcohol in moderation  Tobacco: Does not smoke  Recreational Drugs: Does not use drugs  Sleep: Generally sleeps well, occasionally wakes up to urinate, feels rested upon waking but experiences fatigue long-term through the day    PAST SURGICAL HISTORY:  - Tonsils and adenoids removal at age 7    Results      The following portions of the patient's history were reviewed and updated as appropriate: allergies, current medications, past family history, past medical history, past social history, past surgical history, and problem list.        A review of systems was performed, and pertinent findings are noted in the HPI.    Objective   BP (!) 166/102 (BP Location: Left arm, Patient Position: Sitting, Cuff Size: Large Adult)   Pulse 76   Temp 98.4 °F (36.9 °C) (Infrared)   Resp 18   Ht 177.8 cm (70\")   Wt 123 " kg (271 lb)   SpO2 97%   BMI 38.88 kg/m²    Class 2 Severe Obesity (BMI >=35 and <=39.9). Obesity-related health conditions include the following: hypertension, diabetes mellitus, and dyslipidemias. Obesity is newly identified. BMI is is above average; BMI management plan is completed. We discussed portion control and increasing exercise.     Physical Exam  Vitals and nursing note reviewed.   Constitutional:       General: He is not in acute distress.     Appearance: He is well-developed. He is obese. He is not diaphoretic.   HENT:      Head: Normocephalic and atraumatic.      Right Ear: External ear normal.      Left Ear: External ear normal.      Nose: Nose normal.   Eyes:      General:         Right eye: No discharge.         Left eye: No discharge.      Conjunctiva/sclera: Conjunctivae normal.   Neck:      Thyroid: No thyromegaly.      Trachea: No tracheal deviation.   Cardiovascular:      Rate and Rhythm: Normal rate and regular rhythm.      Heart sounds: Normal heart sounds.   Pulmonary:      Effort: Pulmonary effort is normal. No respiratory distress.      Breath sounds: Normal breath sounds. No stridor. No wheezing.   Chest:      Chest wall: No tenderness.   Abdominal:      General: There is no distension.      Palpations: Abdomen is soft.      Tenderness: There is no abdominal tenderness.   Musculoskeletal:         General: Normal range of motion.      Cervical back: Normal range of motion.   Lymphadenopathy:      Cervical: No cervical adenopathy.   Skin:     General: Skin is warm and dry.   Neurological:      Mental Status: He is alert and oriented to person, place, and time.      Motor: No abnormal muscle tone.      Coordination: Coordination normal.   Psychiatric:         Behavior: Behavior normal.         Thought Content: Thought content normal.         Judgment: Judgment normal.         Assessment & Plan   Problems Addressed this Visit          Cardiac and Vasculature    Mixed hyperlipidemia -  Primary    Relevant Medications    Semaglutide-Weight Management (Wegovy) 0.25 MG/0.5ML solution auto-injector    Other Relevant Orders    Lipid Panel    Primary hypertension    Relevant Medications    tamsulosin (FLOMAX) 0.4 MG capsule 24 hr capsule    candesartan (ATACAND) 32 MG tablet    Semaglutide-Weight Management (Wegovy) 0.25 MG/0.5ML solution auto-injector    Other Relevant Orders    CBC & Differential    Comprehensive Metabolic Panel       Coag and Thromboembolic    Thrombocytopenia    Relevant Medications    Cyanocobalamin (B-12 PO)    Other Relevant Orders    Peripheral Blood Smear       Endocrine and Metabolic    Class 2 severe obesity due to excess calories with serious comorbidity and body mass index (BMI) of 38.0 to 38.9 in adult    Relevant Medications    Testosterone Enanthate (Xyosted) 100 MG/0.5ML solution auto-injector    Semaglutide-Weight Management (Wegovy) 0.25 MG/0.5ML solution auto-injector    Low testosterone    Relevant Medications    Testosterone Enanthate (Xyosted) 100 MG/0.5ML solution auto-injector    Semaglutide-Weight Management (Wegovy) 0.25 MG/0.5ML solution auto-injector    Other Relevant Orders    Testosterone       Gastrointestinal Abdominal     Splenomegaly    Relevant Medications    colchicine (Mitigare) 0.6 MG capsule capsule    Other Relevant Orders    CBC & Differential    Vitamin B12    Folate       Genitourinary and Reproductive     Benign prostatic hyperplasia with urinary hesitancy    Relevant Medications    Testosterone Enanthate (Xyosted) 100 MG/0.5ML solution auto-injector    Multiple Vitamins-Minerals (ZINC PO)    candesartan (ATACAND) 32 MG tablet    Other Relevant Orders    PSA Screen       Musculoskeletal and Injuries    Chronic gout of multiple sites    Relevant Medications    allopurinol (ZYLOPRIM) 300 MG tablet    febuxostat (ULORIC) 40 MG tablet     Other Visit Diagnoses         Hypersomnia        Relevant Orders    Home Sleep Study      Prostate cancer  screening        Relevant Medications    Testosterone Enanthate (Xyosted) 100 MG/0.5ML solution auto-injector    Multiple Vitamins-Minerals (ZINC PO)    candesartan (ATACAND) 32 MG tablet    Other Relevant Orders    PSA Screen      Diabetes mellitus screening        Relevant Medications    candesartan (ATACAND) 32 MG tablet    Semaglutide-Weight Management (Wegovy) 0.25 MG/0.5ML solution auto-injector    Other Relevant Orders    Hemoglobin A1c      Chronic fatigue        Relevant Orders    T4, Free    TSH    Vitamin D,25-Hydroxy    Vitamin B12    Folate      Food intolerance        Relevant Medications    colchicine (Mitigare) 0.6 MG capsule capsule    Cyanocobalamin (B-12 PO)    Multiple Vitamins-Minerals (ZINC PO)    Other Relevant Orders    Alpha-Gal IgE Panel          Diagnoses         Codes Comments      Mixed hyperlipidemia    -  Primary ICD-10-CM: E78.2  ICD-9-CM: 272.2       Primary hypertension     ICD-10-CM: I10  ICD-9-CM: 401.9       Class 2 severe obesity due to excess calories with serious comorbidity and body mass index (BMI) of 38.0 to 38.9 in adult     ICD-10-CM: E66.812, E66.01, Z68.38  ICD-9-CM: 278.01, V85.38       Chronic gout of multiple sites, unspecified cause     ICD-10-CM: M1A.09X0  ICD-9-CM: 274.02       Benign prostatic hyperplasia with urinary hesitancy     ICD-10-CM: N40.1, R39.11  ICD-9-CM: 600.01, 788.64       Thrombocytopenia     ICD-10-CM: D69.6  ICD-9-CM: 287.5       Splenomegaly     ICD-10-CM: R16.1  ICD-9-CM: 789.2       Hypersomnia     ICD-10-CM: G47.10  ICD-9-CM: 780.54       Low testosterone     ICD-10-CM: R79.89  ICD-9-CM: 790.99       Prostate cancer screening     ICD-10-CM: Z12.5  ICD-9-CM: V76.44       Diabetes mellitus screening     ICD-10-CM: Z13.1  ICD-9-CM: V77.1       Chronic fatigue     ICD-10-CM: R53.82  ICD-9-CM: 780.79       Food intolerance     ICD-10-CM: K90.49  ICD-9-CM: 579.8             Assessment & Plan  1. Weight gain.  - His weight gain over the past few  years, coupled with fatigue and nocturia, suggests possible sleep apnea.  - A home sleep study will be ordered to confirm the diagnosis. Blood work will also be ordered to further investigate his symptoms.  - Past records from hematology will be requested for review.  - An injectable weight loss medication will be sent to the pharmacy for prior authorization with his insurance. If initial approval is not granted, the results of the sleep study will be awaited before attempting re-approval. He is advised to discontinue all supplements except vitamins until further notice.    2. Elevated blood pressure.  - His blood pressure remains elevated at 166/102 today, despite being on the maximum dose of candesartan 32 mg daily.  - The current medication regimen will be maintained, and his blood pressure will be closely monitored to ensure it remains within normal limits when consistently taking the medication.  - A refill of his blood pressure medication has been sent to the pharmacy.  - He is advised to monitor his blood pressure regularly.    3. Low testosterone.  - He is currently on testosterone injections 100 mg once a week but reports that it is not adequately boosting his testosterone levels.  - A change in the type of testosterone medication will be considered to find a more effective and cost-efficient option.  - He is advised to continue with his current regimen until further notice.    4. Bloating.  - He reports persistent bloating and a hard stomach regardless of diet.  - Previous endoscopy results will be reviewed to rule out any underlying gastrointestinal issues.  - No abnormalities were found in the previous endoscopy except for a tiny polyp.  - Further investigation will be conducted based on the results of the blood work.    Follow-up: Next scheduled visit in 1 month.               Transcribed from ambient dictation for Alison Bolton MD by Alison Bolton MD.  08/01/25   08:33 CDT    Patient or  patient representative verbalized consent for the use of Ambient Listening during the visit with  Alison Bolton MD for chart documentation. 8/1/2025  08:33 CDT          This document has been electronically signed by Alison Bolton MD on August 1, 2025 08:34 CDT

## 2025-08-06 LAB
25(OH)D3+25(OH)D2 SERPL-MCNC: 38.9 NG/ML (ref 30–100)
ALBUMIN SERPL-MCNC: 4.8 G/DL (ref 3.5–5.2)
ALBUMIN/GLOB SERPL: 1.8 G/DL
ALP SERPL-CCNC: 101 U/L (ref 39–117)
ALPHA-GAL IGE QN: <0.1 KU/L
ALT SERPL-CCNC: 32 U/L (ref 1–41)
AST SERPL-CCNC: 39 U/L (ref 1–40)
BASOPHILS # BLD AUTO: 0.07 10*3/MM3 (ref 0–0.2)
BASOPHILS NFR BLD AUTO: 0.9 % (ref 0–1.5)
BEEF IGE QN: <0.1 KU/L
BILIRUB SERPL-MCNC: 1.4 MG/DL (ref 0–1.2)
BUN SERPL-MCNC: 13 MG/DL (ref 6–20)
BUN/CREAT SERPL: 12 (ref 7–25)
CALCIUM SERPL-MCNC: 10.4 MG/DL (ref 8.6–10.5)
CHLORIDE SERPL-SCNC: 101 MMOL/L (ref 98–107)
CHOLEST SERPL-MCNC: 197 MG/DL (ref 0–200)
CO2 SERPL-SCNC: 26.2 MMOL/L (ref 22–29)
CREAT SERPL-MCNC: 1.08 MG/DL (ref 0.76–1.27)
EGFRCR SERPLBLD CKD-EPI 2021: 80 ML/MIN/1.73
EOSINOPHIL # BLD AUTO: 0.64 10*3/MM3 (ref 0–0.4)
EOSINOPHIL NFR BLD AUTO: 7.8 % (ref 0.3–6.2)
ERYTHROCYTE [DISTWIDTH] IN BLOOD BY AUTOMATED COUNT: 12.5 % (ref 12.3–15.4)
GLOBULIN SER CALC-MCNC: 2.7 GM/DL
GLUCOSE SERPL-MCNC: 80 MG/DL (ref 65–99)
HBA1C MFR BLD: 5.6 % (ref 4.8–5.6)
HCT VFR BLD AUTO: 51.3 % (ref 37.5–51)
HDLC SERPL-MCNC: 33 MG/DL (ref 40–60)
HGB BLD-MCNC: 16.7 G/DL (ref 13–17.7)
IGE SERPL-ACNC: 6 IU/ML (ref 6–495)
IMM GRANULOCYTES # BLD AUTO: 0.02 10*3/MM3 (ref 0–0.05)
IMM GRANULOCYTES NFR BLD AUTO: 0.2 % (ref 0–0.5)
LAMB IGE QN: <0.1 KU/L
LDLC SERPL CALC-MCNC: 142 MG/DL (ref 0–100)
LYMPHOCYTES # BLD AUTO: 2.77 10*3/MM3 (ref 0.7–3.1)
LYMPHOCYTES NFR BLD AUTO: 33.7 % (ref 19.6–45.3)
MCH RBC QN AUTO: 30.4 PG (ref 26.6–33)
MCHC RBC AUTO-ENTMCNC: 32.6 G/DL (ref 31.5–35.7)
MCV RBC AUTO: 93.4 FL (ref 79–97)
MONOCYTES # BLD AUTO: 1.19 10*3/MM3 (ref 0.1–0.9)
MONOCYTES NFR BLD AUTO: 14.5 % (ref 5–12)
NEUTROPHILS # BLD AUTO: 3.52 10*3/MM3 (ref 1.7–7)
NEUTROPHILS NFR BLD AUTO: 42.9 % (ref 42.7–76)
PLATELET # BLD AUTO: 90 10*3/MM3 (ref 140–450)
PORK IGE QN: <0.1 KU/L
POTASSIUM SERPL-SCNC: 4.4 MMOL/L (ref 3.5–5.2)
PROT SERPL-MCNC: 7.5 G/DL (ref 6–8.5)
PSA SERPL-MCNC: 3.16 NG/ML (ref 0–4)
RBC # BLD AUTO: 5.49 10*6/MM3 (ref 4.14–5.8)
SERVICE CMNT-IMP: NORMAL
SODIUM SERPL-SCNC: 143 MMOL/L (ref 136–145)
T4 FREE SERPL-MCNC: 1 NG/DL (ref 0.92–1.68)
TESTOST SERPL-MCNC: 792 NG/DL (ref 264–916)
TRIGL SERPL-MCNC: 118 MG/DL (ref 0–150)
TSH SERPL DL<=0.005 MIU/L-ACNC: 2.78 UIU/ML (ref 0.27–4.2)
VLDLC SERPL CALC-MCNC: 22 MG/DL (ref 5–40)
WBC # BLD AUTO: 8.21 10*3/MM3 (ref 3.4–10.8)

## 2025-08-07 LAB
FOLATE SERPL-MCNC: 11 NG/ML (ref 4.78–24.2)
VIT B12 SERPL-MCNC: >2000 PG/ML (ref 211–946)
WRITTEN AUTHORIZATION: NORMAL

## 2025-08-18 ENCOUNTER — RESULTS FOLLOW-UP (OUTPATIENT)
Dept: FAMILY MEDICINE CLINIC | Facility: CLINIC | Age: 60
End: 2025-08-18
Payer: COMMERCIAL

## 2025-08-20 RX ORDER — FEBUXOSTAT 40 MG/1
40 TABLET, FILM COATED ORAL DAILY
Qty: 30 TABLET | Refills: 1 | Status: SHIPPED | OUTPATIENT
Start: 2025-08-20

## (undated) DEVICE — CANNULA NSL AD L7FT DIV O2 CO2 W/ M LUERLOCK TRMPT CONN

## (undated) DEVICE — ENDO KIT,LOURDES HOSPITAL: Brand: MEDLINE INDUSTRIES, INC.

## (undated) DEVICE — FORCEPS BX L240CM JAW DIA2.8MM L CAP W/ NDL MIC MESH TOOTH

## (undated) DEVICE — CLEANING SPONGE: Brand: KOALA™

## (undated) DEVICE — SINGLE PORT MANIFOLD: Brand: NEPTUNE 2

## (undated) DEVICE — SUPPLEMENT DIGESTIVE H2O SOL GI-EASE

## (undated) DEVICE — ADAPTER CLEANING PORPOISE CLEANING

## (undated) DEVICE — FORCEPS BX 240CM 2.4MM L NDL RAD JAW 4 M00513334

## (undated) DEVICE — SINGLE-USE POLYPECTOMY SNARE: Brand: CAPTIVATOR

## (undated) DEVICE — BRUSH ENDOSCP 2 END CHN HEDGEHOG

## (undated) DEVICE — BITE BLOCK ENDOSCP AD 60 FR W/ ADJ STRP PLAS GRN BLOX

## (undated) DEVICE — CLEANING BRUSH - SINGLE USE: Brand: SAFEGUIDE®